# Patient Record
Sex: FEMALE | Race: WHITE | NOT HISPANIC OR LATINO
[De-identification: names, ages, dates, MRNs, and addresses within clinical notes are randomized per-mention and may not be internally consistent; named-entity substitution may affect disease eponyms.]

---

## 2017-06-05 ENCOUNTER — APPOINTMENT (OUTPATIENT)
Dept: CARDIOLOGY | Facility: CLINIC | Age: 72
End: 2017-06-05

## 2017-06-05 VITALS — BODY MASS INDEX: 26.33 KG/M2 | WEIGHT: 158 LBS | HEIGHT: 65 IN

## 2017-06-05 VITALS — HEART RATE: 84 BPM | DIASTOLIC BLOOD PRESSURE: 80 MMHG | RESPIRATION RATE: 18 BRPM | SYSTOLIC BLOOD PRESSURE: 130 MMHG

## 2017-06-27 ENCOUNTER — APPOINTMENT (OUTPATIENT)
Dept: CARDIOLOGY | Facility: CLINIC | Age: 72
End: 2017-06-27

## 2017-09-27 ENCOUNTER — APPOINTMENT (OUTPATIENT)
Dept: CARDIOLOGY | Facility: CLINIC | Age: 72
End: 2017-09-27

## 2017-09-27 VITALS — HEART RATE: 88 BPM | SYSTOLIC BLOOD PRESSURE: 120 MMHG | DIASTOLIC BLOOD PRESSURE: 80 MMHG | RESPIRATION RATE: 18 BRPM

## 2017-09-27 VITALS — WEIGHT: 158 LBS | HEIGHT: 65 IN | BODY MASS INDEX: 26.33 KG/M2

## 2017-11-16 ENCOUNTER — OUTPATIENT (OUTPATIENT)
Dept: OUTPATIENT SERVICES | Facility: HOSPITAL | Age: 72
LOS: 1 days | Discharge: HOME | End: 2017-11-16

## 2017-11-17 DIAGNOSIS — I10 ESSENTIAL (PRIMARY) HYPERTENSION: ICD-10-CM

## 2017-11-17 DIAGNOSIS — E83.52 HYPERCALCEMIA: ICD-10-CM

## 2017-11-17 DIAGNOSIS — N18.3 CHRONIC KIDNEY DISEASE, STAGE 3 (MODERATE): ICD-10-CM

## 2017-12-05 ENCOUNTER — APPOINTMENT (OUTPATIENT)
Dept: CARDIOLOGY | Facility: CLINIC | Age: 72
End: 2017-12-05

## 2017-12-05 VITALS — WEIGHT: 157 LBS | HEIGHT: 65 IN | BODY MASS INDEX: 26.16 KG/M2

## 2017-12-05 VITALS — SYSTOLIC BLOOD PRESSURE: 118 MMHG | HEART RATE: 80 BPM | RESPIRATION RATE: 18 BRPM | DIASTOLIC BLOOD PRESSURE: 70 MMHG

## 2018-06-13 ENCOUNTER — APPOINTMENT (OUTPATIENT)
Dept: CARDIOLOGY | Facility: CLINIC | Age: 73
End: 2018-06-13

## 2018-06-13 VITALS — WEIGHT: 158 LBS | HEIGHT: 65 IN | BODY MASS INDEX: 26.33 KG/M2

## 2018-06-13 VITALS — SYSTOLIC BLOOD PRESSURE: 120 MMHG | DIASTOLIC BLOOD PRESSURE: 80 MMHG | HEART RATE: 76 BPM | RESPIRATION RATE: 18 BRPM

## 2018-06-13 DIAGNOSIS — Z87.19 PERSONAL HISTORY OF OTHER DISEASES OF THE DIGESTIVE SYSTEM: ICD-10-CM

## 2018-06-13 DIAGNOSIS — I35.0 NONRHEUMATIC AORTIC (VALVE) STENOSIS: ICD-10-CM

## 2018-06-13 RX ORDER — ASPIRIN 81 MG
81 TABLET, DELAYED RELEASE (ENTERIC COATED) ORAL
Refills: 0 | Status: ACTIVE | COMMUNITY

## 2018-06-20 ENCOUNTER — OUTPATIENT (OUTPATIENT)
Dept: OUTPATIENT SERVICES | Facility: HOSPITAL | Age: 73
LOS: 1 days | Discharge: HOME | End: 2018-06-20

## 2018-06-20 DIAGNOSIS — J15.9 UNSPECIFIED BACTERIAL PNEUMONIA: ICD-10-CM

## 2018-08-17 ENCOUNTER — APPOINTMENT (OUTPATIENT)
Dept: CARDIOLOGY | Facility: CLINIC | Age: 73
End: 2018-08-17

## 2018-08-22 ENCOUNTER — APPOINTMENT (OUTPATIENT)
Dept: CARDIOLOGY | Facility: CLINIC | Age: 73
End: 2018-08-22

## 2018-08-22 VITALS — RESPIRATION RATE: 18 BRPM | HEART RATE: 88 BPM | SYSTOLIC BLOOD PRESSURE: 120 MMHG | DIASTOLIC BLOOD PRESSURE: 80 MMHG

## 2018-08-22 VITALS — HEIGHT: 66 IN | WEIGHT: 156 LBS | BODY MASS INDEX: 25.07 KG/M2

## 2018-08-22 DIAGNOSIS — I34.0 NONRHEUMATIC MITRAL (VALVE) INSUFFICIENCY: ICD-10-CM

## 2018-08-22 DIAGNOSIS — Z98.890 OTHER SPECIFIED POSTPROCEDURAL STATES: ICD-10-CM

## 2018-08-22 DIAGNOSIS — E78.5 HYPERLIPIDEMIA, UNSPECIFIED: ICD-10-CM

## 2018-08-22 DIAGNOSIS — I73.9 PERIPHERAL VASCULAR DISEASE, UNSPECIFIED: ICD-10-CM

## 2018-08-22 DIAGNOSIS — I10 ESSENTIAL (PRIMARY) HYPERTENSION: ICD-10-CM

## 2018-08-22 DIAGNOSIS — Z95.828 PRESENCE OF OTHER VASCULAR IMPLANTS AND GRAFTS: ICD-10-CM

## 2018-08-22 DIAGNOSIS — R06.02 SHORTNESS OF BREATH: ICD-10-CM

## 2018-08-22 DIAGNOSIS — I25.10 ATHEROSCLEROTIC HEART DISEASE OF NATIVE CORONARY ARTERY W/OUT ANGINA PECTORIS: ICD-10-CM

## 2018-08-22 DIAGNOSIS — Z86.39 PERSONAL HISTORY OF OTHER ENDOCRINE, NUTRITIONAL AND METABOLIC DISEASE: ICD-10-CM

## 2018-08-22 DIAGNOSIS — N18.9 CHRONIC KIDNEY DISEASE, UNSPECIFIED: ICD-10-CM

## 2018-08-23 ENCOUNTER — RESULT REVIEW (OUTPATIENT)
Age: 73
End: 2018-08-23

## 2018-08-23 ENCOUNTER — OUTPATIENT (OUTPATIENT)
Dept: OUTPATIENT SERVICES | Facility: HOSPITAL | Age: 73
LOS: 1 days | Discharge: HOME | End: 2018-08-23

## 2018-08-23 VITALS
WEIGHT: 151.9 LBS | HEART RATE: 103 BPM | SYSTOLIC BLOOD PRESSURE: 92 MMHG | HEIGHT: 62 IN | RESPIRATION RATE: 18 BRPM | DIASTOLIC BLOOD PRESSURE: 49 MMHG | TEMPERATURE: 98 F

## 2018-08-23 VITALS
SYSTOLIC BLOOD PRESSURE: 109 MMHG | HEART RATE: 80 BPM | OXYGEN SATURATION: 95 % | DIASTOLIC BLOOD PRESSURE: 57 MMHG | RESPIRATION RATE: 18 BRPM

## 2018-08-23 DIAGNOSIS — R19.8 OTHER SPECIFIED SYMPTOMS AND SIGNS INVOLVING THE DIGESTIVE SYSTEM AND ABDOMEN: Chronic | ICD-10-CM

## 2018-08-23 RX ORDER — MEPERIDINE HYDROCHLORIDE 50 MG/ML
50 INJECTION INTRAMUSCULAR; INTRAVENOUS; SUBCUTANEOUS ONCE
Qty: 0 | Refills: 0 | Status: DISCONTINUED | OUTPATIENT
Start: 2018-08-23 | End: 2018-09-07

## 2018-08-23 RX ORDER — MIDAZOLAM HYDROCHLORIDE 1 MG/ML
2 INJECTION, SOLUTION INTRAMUSCULAR; INTRAVENOUS ONCE
Qty: 0 | Refills: 0 | Status: DISCONTINUED | OUTPATIENT
Start: 2018-08-23 | End: 2018-09-07

## 2018-08-23 NOTE — H&P ADULT - NSHPPHYSICALEXAM_GEN_ALL_CORE
PHYSICAL EXAM:    T(F): 98 (08-23-18 @ 06:39), Max: 98 (08-23-18 @ 06:39)  HR: 103 (08-23-18 @ 06:39) (103 - 103)  BP: 92/49 (08-23-18 @ 06:39) (92/49 - 92/49)  RR: 18 (08-23-18 @ 06:39) (18 - 18)  GENERAL: NAD, well-developed  CHEST/LUNG: CTAB  HEART: RRR, no murmurs  ABDOMEN: Soft, Nontender, Nondistended; Bowel sounds present  EXTREMITIES:  2+ Peripheral Pulses, No clubbing, cyanosis, or edema  NEURO: AAOx3

## 2018-08-23 NOTE — ASU PATIENT PROFILE, ADULT - PMH
H/O heart surgery  STENT PLACEMENT BYPASS AORTA CELIAC STENT  HTN (hypertension)    MI (myocardial infarction)

## 2018-08-24 LAB
GRAM STN FLD: SIGNIFICANT CHANGE UP
NIGHT BLUE STAIN TISS: SIGNIFICANT CHANGE UP
SPECIMEN SOURCE: SIGNIFICANT CHANGE UP
SPECIMEN SOURCE: SIGNIFICANT CHANGE UP

## 2018-08-25 LAB
CULTURE RESULTS: SIGNIFICANT CHANGE UP
SPECIMEN SOURCE: SIGNIFICANT CHANGE UP

## 2018-08-27 LAB — SURGICAL PATHOLOGY STUDY: SIGNIFICANT CHANGE UP

## 2018-08-28 DIAGNOSIS — I10 ESSENTIAL (PRIMARY) HYPERTENSION: ICD-10-CM

## 2018-08-28 DIAGNOSIS — C34.00 MALIGNANT NEOPLASM OF UNSPECIFIED MAIN BRONCHUS: ICD-10-CM

## 2018-08-28 DIAGNOSIS — R59.9 ENLARGED LYMPH NODES, UNSPECIFIED: ICD-10-CM

## 2018-08-28 DIAGNOSIS — R91.8 OTHER NONSPECIFIC ABNORMAL FINDING OF LUNG FIELD: ICD-10-CM

## 2018-08-29 LAB — NON-GYNECOLOGICAL CYTOLOGY STUDY: SIGNIFICANT CHANGE UP

## 2018-08-31 ENCOUNTER — RESULT REVIEW (OUTPATIENT)
Age: 73
End: 2018-08-31

## 2018-09-01 PROBLEM — I10 ESSENTIAL (PRIMARY) HYPERTENSION: Chronic | Status: ACTIVE | Noted: 2018-08-23

## 2018-09-01 PROBLEM — Z98.890 OTHER SPECIFIED POSTPROCEDURAL STATES: Chronic | Status: ACTIVE | Noted: 2018-08-23

## 2018-09-01 PROBLEM — I21.9 ACUTE MYOCARDIAL INFARCTION, UNSPECIFIED: Chronic | Status: ACTIVE | Noted: 2018-08-23

## 2018-09-06 LAB — NON-GYNECOLOGICAL CYTOLOGY STUDY: SIGNIFICANT CHANGE UP

## 2018-09-09 ENCOUNTER — MOBILE ON CALL (OUTPATIENT)
Age: 73
End: 2018-09-09

## 2018-09-10 ENCOUNTER — APPOINTMENT (OUTPATIENT)
Dept: HEMATOLOGY ONCOLOGY | Facility: CLINIC | Age: 73
End: 2018-09-10

## 2018-09-10 VITALS
TEMPERATURE: 96.7 F | SYSTOLIC BLOOD PRESSURE: 101 MMHG | HEART RATE: 112 BPM | DIASTOLIC BLOOD PRESSURE: 47 MMHG | RESPIRATION RATE: 18 BRPM | HEIGHT: 66 IN

## 2018-09-10 DIAGNOSIS — I26.99 OTHER PULMONARY EMBOLISM W/OUT ACUTE COR PULMONALE: ICD-10-CM

## 2018-09-10 DIAGNOSIS — C34.92 MALIGNANT NEOPLASM OF UNSPECIFIED PART OF RIGHT BRONCHUS OR LUNG: ICD-10-CM

## 2018-09-10 DIAGNOSIS — J44.9 CHRONIC OBSTRUCTIVE PULMONARY DISEASE, UNSPECIFIED: ICD-10-CM

## 2018-09-10 DIAGNOSIS — C34.91 MALIGNANT NEOPLASM OF UNSPECIFIED PART OF RIGHT BRONCHUS OR LUNG: ICD-10-CM

## 2018-09-10 DIAGNOSIS — D63.0 ANEMIA IN NEOPLASTIC DISEASE: ICD-10-CM

## 2018-09-10 RX ORDER — ENOXAPARIN SODIUM 80 MG/.8ML
80 INJECTION SUBCUTANEOUS
Refills: 0 | Status: ACTIVE | COMMUNITY

## 2018-09-10 RX ORDER — CLOPIDOGREL 75 MG/1
75 TABLET, FILM COATED ORAL
Refills: 0 | Status: COMPLETED | COMMUNITY
End: 2018-09-10

## 2018-09-10 RX ORDER — PREDNISONE 50 MG/1
TABLET ORAL
Refills: 0 | Status: ACTIVE | COMMUNITY

## 2018-09-11 PROBLEM — J44.9 COPD (CHRONIC OBSTRUCTIVE PULMONARY DISEASE): Status: ACTIVE | Noted: 2018-09-11

## 2018-09-14 PROBLEM — I26.99 PULMONARY EMBOLISM: Status: ACTIVE | Noted: 2018-09-11

## 2018-09-14 PROBLEM — C34.91: Status: ACTIVE | Noted: 2018-09-14

## 2018-09-14 PROBLEM — D63.0 ANEMIA IN NEOPLASTIC DISEASE: Status: ACTIVE | Noted: 2018-09-14

## 2018-09-14 NOTE — ASSESSMENT
[Palliative Care Plan] : Patient was apprised of incurable nature of disease.  Hospice and Palliative care options discussed. [FreeTextEntry1] : In summery this is a 72 year old female with multiple medical problems including hypertension, hyperlipidemia, PCI of RCA , PVD and Mitral regurgitation and  small bowel obstruction. Had lysis of adhesions followed by stenting of the celiac and mesenteric arteries, severe COPD, ECOG of 4, high probability of PE and stage IV Squamous cell carcinoma of the lung.\par \par #Stage IV metastatic SCC of lung ( contralateral nodules) she is to frail for any definitive treatment even if the contralateral nodules are benign \par -she has end stage severe COPD,  I spoke to Dr. Dupree who does not think the patient has interstitial lung disease as a separate entity \par -her ECOG is a 4 and she is declining\par -I explained to them that hospice is reasonable but they were not ready for this\par -she is to frail for chemotherapy or chemoimmunotherapy\par -I explained that she is unlikly to [place] any  mutation given SCC, and smoker but will check for EFGR/ALK/ROS/BRAF and will stain for PD-l1\par - I will offer immunotherapy but if she declined would transition to hospice\par --depending on PD-l1 results will offer Keytruda  if  >1% or Opdivo if <1%. We went over response rates based on PD-l1.  We went over the side effects such as fatigue, rash, pneumonitis, colitis, and rarely death, full information was provided in writing on both  treatment\par \par # SOB this is mainly due to COPD maybe a component from cancer and PE and Anemia \par -to complete steroid taper \par -on oxygen\par -inhalers\par \par # PE\par -I am not sure if she had a PE, she only had V/Q scan due to CKD but is on Lovenox GFR from records appears adequate for Lovenox, V/q may have been false positive and she has reasons for SOB but also high risk for MA\par -will continue with Lovenox and transition her to Apixaban after she competes her syringes \par \par #Anemia, chronic, moderate,  is multifactor, due to malignancy, CKD and chronic disease\par -will monitor will consider transfusion if becomes more symptomatic and Hgb falls \par \par Prognosis is poor  individual instruction

## 2018-09-14 NOTE — REVIEW OF SYSTEMS
[Lower Ext Edema] : lower extremity edema [Shortness Of Breath] : shortness of breath [Wheezing] : wheezing [Cough] : cough [SOB on Exertion] : shortness of breath during exertion [Negative] : Allergic/Immunologic [Chills] : no chills [Recent Change In Weight] : ~T no recent weight change

## 2018-09-14 NOTE — RESULTS/DATA
[FreeTextEntry1] : IMPRESSION:\par \par New pulmonary findings. The 2 masses in the right lower lobe is suspicious for neoplasm. The other pulmonary nodules that are in the range of 3 to 5 mm may represent metastatic disease. PET CT strongly recommended.\par Significant an enlarged mediastinal and possibly a left hilar lymph nodes. Mild narrowing of the left upper lobe segmental bronchus.\par Interstitial disease slightly worsened.\par Patchy airspace disease in the lingula likely secondary to mild compression and/or airspace disease. It does not appear to be suspicious.\par \par An entry was made into the [employer] Website to ensure clinician receipt of report.\par \par CT CHEST WITHOUT IV CONTRAST\par \par CLINICAL INDICATION: 25 pack year smoking history. Shortness of breath and cough. No known primary. Coronary stent.\par \par COMPARISON: 2013\par \par TECHNIQUE: Noncontrast chest CT was performed. Multiplanar CT and HRCT images were reviewed.\par \par FINDINGS:\par \par LUNGS, AIRWAYS: Me trachea main and visualized segmental bronchi are patent. There is mild narrowing at the origin off the left upper lobe segmental bronchi. There is mild traction bronchiectasis off the middle lobe lingula right lower lobe bronchi secondary to chronic changes.\par Severe centrilobular emphysema as previously.\par Multiple bullae predominantly in the lower lobes also as previously.\par Interstitial fibrosis with stacked cystic appearance mildly worsened.\par 5 mm nodule right upper lobe image 13 a new finding.\par 5 mm nodule medial left upper lobe in image 18 new finding.\par 3 mm nodule adjacent to the horizontal fissure on the right new.\par 4 mm nodule left lower lobe in image 38 is new.\par Patchy areas of consolidation in the lingula new.\par There is a stellate density in the right lower lobe 8 mm and adjacent to it a 1.2 cm density in image 44 both new..\par \par PLEURA: No pleural fluid.\par \par MEDIASTINUM, RICHY, LYMPH NODES: No significant axillary adenopathy. Superior mediastinal adenopathy increased in size. Pretracheal lymph node short axis X mm and another short axis IX mm both larger than previously. Significant adenopathy more inferiorly in the paratracheal region on the right 1.6 cm short axis. Aortopulmonary window lymph node short axis a 1 cm. Precarinal adenopathy short axis I.7 and 1.7 cm. Subcarinal adenopathy short axis II.2 cm. No obvious hilar nodes on the right. However the left hilum is larger than previously with narrowing of the left upper lobe bronchus. This may be due to adenopathy. Exact measurements are not possible secondary to lack of intravenous contrast. This is best seen in image 29 and 30. Thoracic esophagus unremarkable. Small type on hiatus hernia.\par \par HEART AND VESSELS: Heart size normal. Aorta normal in caliber. Coronary artery calcification. No pericardial fluid.\par \par UPPER ABDOMEN: No abnormality. Adrenals normal.\par \par BONES AND SOFT TISSUES: Soft tissues normal. No aggressive bony lesion.\par \par LOWER NECK: No abnormality.\par \par Electronic Signature: I personally reviewed the images and agree with this report. Final Report: Dictated by and Signed by Attending Mari Gallo MD 6/20/2018 4:54 PM\par  \par \par \par IMPRESSION:\par \par 3. FDG AVID RIGHT SUPRACLAVICULAR LYMPH NODE, MOST COMPATIBLE WITH AMIRAH METASTATIC DISEASE.\par \par Electronic Signature: I personally reviewed the images and agree with this report. Final Report: Dictated by and Signed by Attending Tracy Cash MD 7/12/2018 1:26 PM\par *******END OF ADDENDUM******\par \par IMPRESSION:\par \par 1. FDG AVID RIGHT LOWER LOBE AND LINGULA LESIONS, REPRESENTING NEOPLASM.\par \par 2. FDG AVID METASTATIC DISEASE INVOLVING THE LUNGS AND MEDIASTINAL LYMPH NODES.\par \par \par \par \par \par \par \par \par Diagnostic confidence level used in this report:\par \par Consistent with/compatible with or no modifier - greater than 98%\par Most likely - greater than 90%\par Likely/probably - greater than 75%\par Possibly 50%\par Less likely - less than 25%\par Unlikely - less than 5%\par \par \par \par \par \par \par \par \par \par F-18 FDG PET/CT SCAN FROM THE SKULL BASE TO THE MID THIGHS\par \par CLINICAL HISTORY: 72-year-old female with suspicious right lower lobe lesion seen on the recent CT scan. 40 year smoking history, quit smoking in 2/2018.\par \par TECHNIQUE: The patient received 11.2 mCi of F-18 FDG intravenously via the right arm vein. 45 minutes later, low-dose noncontrast transmission CT acquisition was performed first. Whole-body PET images were obtained from the base of the skull through the mid thighs immediately after acquisition of the CT scan without changing the patient's positioning. Noncontrast low-dose CT images were obtained for the purposes of attenuation correction and anatomic co-registration with PET images. Fused images of PET (metabolic) and CT (anatomy) were reviewed. The patient's blood glucose level at the time of FDG injection was 115 mg/dl.\par \par Comparison: 6/20/2018 CT scan.\par \par FINDINGS:\par \par HEAD AND NECK:\par \par There is a 1 cm right supraclavicular lymph node at the level of the right thyroid gland with a maximum SUV of 2.3 (series 2 image 51), most compatible with amirah metastatic disease.\par \par Physiologic activity is visualized in the nasopharynx, oral cavity, hypopharynx, larynx and thyroid gland.\par \par CT images demonstrate no evidence of left cervical adenopathy. The thyroid gland is unremarkable. Mild mucosal thickening of the ethmoidal air cells is seen.\par \par THORAX:\par \par There is hypermetabolic activity in the 1.4 cm density with pleural attachment in the lateral basal segment of the right lower lobe with a maximum SUV of 6.2 (series 2 image 106) and 1.9 x 1.6 cm lobulated nodule in the lower lingula abutting the left fissure with a maximum SUV of 14.4 (series 2 image 99), consistent with neoplasms.\par \par FDG avid pulmonary nodules are identified:\par -1.2 x 1 cm nodule in the left lower lobe with a maximum SUV of 5.5 (series 2 image 99)\par -7 mm in nodule in the left lower lobe with a maximum SUV of 4.9 (series 2 image 95)\par -7 mm in nodule in the right upper lobe with a maximum SUV of 2.4 (series 2 image 62).\par FDG nonavid bilateral subcentimeter pulmonary nodules are identified, for example: Right upper lobe (series 2 image 87), left upper lobe (series 2 image 64, 73). All of the above lesions are consistent with pulmonary metastatic disease.\par \par Hypermetabolic mediastinal adenopathy is visualized:\par -Pretracheal, right paratracheal and precarinal adenopathy, the largest measuring up to 2.6 x 1.7 cm with a maximum SUV of 9.2 (series 2 image 76)\par -Aortopulmonary window adenopathy, the largest measuring up to 2.6 x 1.9 cm with a maximum SUV of 10.2 (series 2 image 76)\par -Subcarinal adenopathy spanning up to 4 x 2.3 cm with a maximum SUV of 12.0 (series 2 image 87)\par -Right hilar adenopathy with a maximum SUV of 4.3 (series 2 image 86)\par -Left hilar adenopathy with a maximum SUV of 5.0 (series 2 image 81).\par All of the above lesions are consistent with amirah metastatic disease.\par \par Diffuse patchy FDG activity is seen in the chronic changes in the lingula and left lower lobe with a maximum SUV of 3.3 (series 2 image 94).\par \par Normal FDG activity is present in the breasts and chest wall.\par \par CT images demonstrate no evidence of axillary adenopathy. The heart is not enlarged. Multivessel coronary calcification and atherosclerotic aortic calcification are present. Stents in the RCA are seen. There is no evidence of pneumothorax, pleural or pericardial effusion.\par \par ABDOMEN AND PELVIS:\par \par Physiologic FDG activity is visualized in all regions.\par \par There is no evidence of hypermetabolic adenopathy within the abdomen and pelvis. The liver and spleen are not enlarged. The adrenal glands are unremarkable. Vascular stents in the celiac artery, distal aorta and common iliac arteries are present. Right renal cysts and atherosclerotic aortic calcification are seen.\par \par SKELETAL SYSTEM:\par \par Normal FDG activity is noted in the axial skeleton and visualized portion of the appendicular skeleton.\par \par CT images demonstrate degenerative changes in the spine. There is no evidence of aggressive lytic or blastic lesion.\par \par \par Electronic Signature: I personally reviewed the images and agree with this report. Final Report: Dictated by and Signed by Attending Tracy Cash MD 7/12/2018 1:08 PM\par

## 2018-09-14 NOTE — HISTORY OF PRESENT ILLNESS
[Disease: _____________________] : Disease: [unfilled] [N: ___] : N[unfilled] [M: ___] : M[unfilled] [AJCC Stage: ____] : AJCC Stage: [unfilled] [de-identified] : CC: I am short of breath.\par \par Here at the request of Dr. Hector Dupree\par \par This is a 72 year old female with history of  hypertension, hyperlipidemia, PCI of RCA , PVD and Mitral regurgitation and  small bowel obstruction. Had lysis of adhesions followed by stenting of the celiac and mesenteric arteries . Saw Dr. Joon Perla.  who diagnosed her with COPD, PFTs were pending.  Work up also included CT chest that led to diagnosis of squamous cell of the lung see work up bellow.\par \par She is here with her  and daughter. They live in [place] and in [place], her daughter lives in  who they are staying with at this point.  She was in her usual state of health. playing tennis, up until February of 2018 when she started to develop SOB and presented to  [facility] in [place] at that time. Records that I have showed a Hemaglobin on 10.2,  Createnin of 1.16 calcium 8.5,  CTA PE protocol 3/2/18: NO PE, severe interstitial lung disease with subpleural honycombing, prominent infrahilar and left periaotic adenopathy, 1.5cm and 2.0 cm subcarinal. Vq sacn from 2/28/18  was intermidate to high probability.  Dopplers 3/1: NO DVT . ECHO 3/1/18:  EF was 60-65%\par \par After that admission she stopped smoking. 2/2018 but smoked in excess of 40 years. \par \par Recently she has acute SOB and was admitted [facility] on 8/31/18. She was streted on Oxygen. He was treated for COPD exacerbation on prednisone taper and on Lovenox for  Pulmonary Embolism. Her work up showed WBC of 17.2, Hgb of 9.7 mcv 84,  plts 389,  D dimer of 4.01 normal up to 0.49 calcium of 9.4, normal LFTs, Cr of 1.38\par MRI Brain  no contrast 8/31/18: No metastasis. V/Q scan  High Probability of PE. No CTA was done due to CKD. CT chest no contrast  Multiple pulmonary nodules bilaterly largest 15 mm with severe Emphysema and chronic interstitial fibriotic changes in bases.  Multiple mediastinal  nodes largest 3.2 cm subcarinal.\par \par Imaging:\par Full reports are in narrative\par CTA PE protocol 3/2/18: CTA PE protocol 3/2/18: NO PE, severe intersitial lung diseae with subpleural honycombing, prominent infrahilar and left periaotic adenopathy, 1.5cm and 2.0 cm subcarinal. \par CT chest w/o IV contrast ( 6/2018): New pulmonary findings. The 2 masses in the right lower lobe is suspicious for neoplasm. The other pulmonary nodules that are in the range of 3 to 5 mm may represent metastatic disease. PET CT strongly recommended.\par Significant an enlarged mediastinal and possibly a left hilar lymph nodes. Mild narrowing of the left upper lobe segmental bronchus.\par Interstitial disease slightly worsened.\par Patchy airspace disease in the lingula likely secondary to mild compression and/or airspace disease. It does not appear to be suspicious.\par PET/CT 7/11/18: Regional:  1. FDG avid right lower lobe and lingual lesion 2.  1. FDG AVID RIGHT LOWER LOBE AND LINGULA LESIONS, REPRESENTING NEOPLASM. 2. FDG AVID METASTATIC DISEASE INVOLVING THE LUNGS AND MEDIASTINAL LYMPH NODES.\par RI Brain  no contrast 8/31/18: No metastasis. V/Q scan  High Probability of PE. No CTA was done due to CKD. CT chest no contrast  Multiple pulmonary nodules bilaterly largest 15 mm with severe Emphysema and chronic interstitial fibriotic changes in bases.  Multiple mediastinal  nodes largest 3.2 cm subcarinal.\par \par \par \par 8/23/18  EBUS Dr. Thien Dupree\par 1     Main carinal biopsy : Invasive squamous cell carcinoma\par 2     Left upper lobe carinal Biopsy  Minute fragment of bronchial mucosa and submucosa showing\par mild chronic nonspecific inflammation and fibrosis.\par \par BRONCHIAL WASH\par SUSPICIOUS FOR MALIGNANCY.\par \par 8/28/18 Cervical Mediastinoscopy:  Level 4 lymph node 3 nodes negative for tumor, Level 4 left negative for tumor (1 node), Level 4 right  node ( 3 nodes) negative for tumor, Level 2 (2 nodes) negative for tumor\par \par Path:   8/31/18 EBUS Dr. Thien Dupree\par LYMPH NODE, SUBCLAVICULAR, EBUS-GUIDED FNA POSITIVE FOR MALIGNANT CELLS.\par - NON-SMALL CELL CARCINOMA.\par - Immunohistochemical stains show the tumor is positive for CK7,\par p63 and/or CK5/6, negative for CK20, TTF1,\par CDX2, and PAX8; support the diagnosis of squamous cell carcinoma.\par \par \par She is currently in an a wheelchair on oxygen. She is unable to ambulate. Her ECOG is a 4. She does not know where to live since she cant get upstairs to her house or her daughters house.  [de-identified] : Squamous Cell

## 2018-09-14 NOTE — PHYSICAL EXAM
[Completely disabled. Cannot carry on any self care. Totally confined to bed or chair] : Status 4- Completely disabled. Cannot carry on any self care. Totally confined to bed or chair [Normal] : affect appropriate [Ulcers] : no ulcers [Thrush] : no thrush [de-identified] : She has edema in lower extremity

## 2018-09-14 NOTE — CONSULT LETTER
[Dear  ___] : Dear  [unfilled], [Consult Letter:] : I had the pleasure of evaluating your patient, [unfilled]. [Please see my note below.] : Please see my note below. [Consult Closing:] : Thank you very much for allowing me to participate in the care of this patient.  If you have any questions, please do not hesitate to contact me. [Sincerely,] : Sincerely, [FreeTextEntry3] : Jossue

## 2018-09-15 ENCOUNTER — OUTPATIENT (OUTPATIENT)
Dept: OUTPATIENT SERVICES | Facility: HOSPITAL | Age: 73
LOS: 1 days | Discharge: HOME | End: 2018-09-15

## 2018-09-15 DIAGNOSIS — I26.99 OTHER PULMONARY EMBOLISM WITHOUT ACUTE COR PULMONALE: ICD-10-CM

## 2018-09-15 DIAGNOSIS — R19.8 OTHER SPECIFIED SYMPTOMS AND SIGNS INVOLVING THE DIGESTIVE SYSTEM AND ABDOMEN: Chronic | ICD-10-CM

## 2018-09-17 RX ORDER — MORPHINE SULFATE 100 MG/5ML
20 SOLUTION ORAL EVERY 6 HOURS
Qty: 1 | Refills: 0 | Status: ACTIVE | COMMUNITY
Start: 2018-09-17 | End: 1900-01-01

## 2018-09-17 RX ORDER — PREDNISONE 20 MG/1
20 TABLET ORAL DAILY
Qty: 60 | Refills: 1 | Status: ACTIVE | COMMUNITY
Start: 2018-09-17 | End: 1900-01-01

## 2018-09-20 LAB
CULTURE RESULTS: SIGNIFICANT CHANGE UP
SPECIMEN SOURCE: SIGNIFICANT CHANGE UP

## 2018-09-21 ENCOUNTER — OUTPATIENT (OUTPATIENT)
Dept: OUTPATIENT SERVICES | Facility: HOSPITAL | Age: 73
LOS: 1 days | Discharge: HOME | End: 2018-09-21

## 2018-09-21 ENCOUNTER — LABORATORY RESULT (OUTPATIENT)
Age: 73
End: 2018-09-21

## 2018-09-21 ENCOUNTER — APPOINTMENT (OUTPATIENT)
Dept: INFUSION THERAPY | Facility: CLINIC | Age: 73
End: 2018-09-21

## 2018-09-21 VITALS
TEMPERATURE: 96 F | SYSTOLIC BLOOD PRESSURE: 81 MMHG | RESPIRATION RATE: 18 BRPM | DIASTOLIC BLOOD PRESSURE: 44 MMHG | HEART RATE: 97 BPM

## 2018-09-21 VITALS — RESPIRATION RATE: 18 BRPM | DIASTOLIC BLOOD PRESSURE: 46 MMHG | HEART RATE: 99 BPM | SYSTOLIC BLOOD PRESSURE: 84 MMHG

## 2018-09-21 DIAGNOSIS — D63.0 ANEMIA IN NEOPLASTIC DISEASE: ICD-10-CM

## 2018-09-21 DIAGNOSIS — C34.90 MALIGNANT NEOPLASM OF UNSPECIFIED PART OF UNSPECIFIED BRONCHUS OR LUNG: ICD-10-CM

## 2018-09-21 DIAGNOSIS — J44.9 CHRONIC OBSTRUCTIVE PULMONARY DISEASE, UNSPECIFIED: ICD-10-CM

## 2018-09-21 DIAGNOSIS — I26.99 OTHER PULMONARY EMBOLISM WITHOUT ACUTE COR PULMONALE: ICD-10-CM

## 2018-09-21 DIAGNOSIS — R19.8 OTHER SPECIFIED SYMPTOMS AND SIGNS INVOLVING THE DIGESTIVE SYSTEM AND ABDOMEN: Chronic | ICD-10-CM

## 2018-09-21 RX ORDER — NIVOLUMAB 10 MG/ML
480 INJECTION INTRAVENOUS ONCE
Qty: 0 | Refills: 0 | Status: COMPLETED | OUTPATIENT
Start: 2018-09-21 | End: 2018-09-21

## 2018-09-21 RX ADMIN — NIVOLUMAB 196 MILLIGRAM(S): 10 INJECTION INTRAVENOUS at 15:35

## 2018-09-23 ENCOUNTER — INPATIENT (INPATIENT)
Facility: HOSPITAL | Age: 73
LOS: 4 days | End: 2018-09-28
Attending: INTERNAL MEDICINE | Admitting: INTERNAL MEDICINE
Payer: MEDICARE

## 2018-09-23 VITALS
SYSTOLIC BLOOD PRESSURE: 129 MMHG | TEMPERATURE: 97 F | DIASTOLIC BLOOD PRESSURE: 68 MMHG | RESPIRATION RATE: 26 BRPM | OXYGEN SATURATION: 93 % | HEART RATE: 118 BPM

## 2018-09-23 DIAGNOSIS — R19.8 OTHER SPECIFIED SYMPTOMS AND SIGNS INVOLVING THE DIGESTIVE SYSTEM AND ABDOMEN: Chronic | ICD-10-CM

## 2018-09-23 DIAGNOSIS — Z95.1 PRESENCE OF AORTOCORONARY BYPASS GRAFT: Chronic | ICD-10-CM

## 2018-09-23 LAB
ALBUMIN SERPL ELPH-MCNC: 2.6 G/DL — LOW (ref 3.5–5.2)
ALP SERPL-CCNC: 97 U/L — SIGNIFICANT CHANGE UP (ref 30–115)
ALT FLD-CCNC: 18 U/L — SIGNIFICANT CHANGE UP (ref 0–41)
ANION GAP SERPL CALC-SCNC: 20 MMOL/L — HIGH (ref 7–14)
APTT BLD: 32.9 SEC — SIGNIFICANT CHANGE UP (ref 27–39.2)
AST SERPL-CCNC: 35 U/L — SIGNIFICANT CHANGE UP (ref 0–41)
BASOPHILS # BLD AUTO: 0 K/UL — SIGNIFICANT CHANGE UP (ref 0–0.2)
BASOPHILS NFR BLD AUTO: 0 % — SIGNIFICANT CHANGE UP (ref 0–1)
BILIRUB SERPL-MCNC: 0.3 MG/DL — SIGNIFICANT CHANGE UP (ref 0.2–1.2)
BUN SERPL-MCNC: 15 MG/DL — SIGNIFICANT CHANGE UP (ref 10–20)
CALCIUM SERPL-MCNC: 9.7 MG/DL — SIGNIFICANT CHANGE UP (ref 8.5–10.1)
CHLORIDE SERPL-SCNC: 96 MMOL/L — LOW (ref 98–110)
CK MB CFR SERPL CALC: 3.2 NG/ML — SIGNIFICANT CHANGE UP (ref 0.6–6.3)
CK MB CFR SERPL CALC: 3.4 NG/ML — SIGNIFICANT CHANGE UP (ref 0.6–6.3)
CK SERPL-CCNC: 45 U/L — SIGNIFICANT CHANGE UP (ref 0–225)
CK SERPL-CCNC: 46 U/L — SIGNIFICANT CHANGE UP (ref 0–225)
CO2 SERPL-SCNC: 22 MMOL/L — SIGNIFICANT CHANGE UP (ref 17–32)
CREAT SERPL-MCNC: 0.8 MG/DL — SIGNIFICANT CHANGE UP (ref 0.7–1.5)
EOSINOPHIL # BLD AUTO: 0 K/UL — SIGNIFICANT CHANGE UP (ref 0–0.7)
EOSINOPHIL NFR BLD AUTO: 0 % — SIGNIFICANT CHANGE UP (ref 0–8)
GAS PNL BLDV: SIGNIFICANT CHANGE UP
GLUCOSE SERPL-MCNC: 150 MG/DL — HIGH (ref 70–99)
HCT VFR BLD CALC: 29.7 % — LOW (ref 37–47)
HGB BLD-MCNC: 9.6 G/DL — LOW (ref 12–16)
INR BLD: 1.35 RATIO — HIGH (ref 0.65–1.3)
LACTATE SERPL-SCNC: 2.2 MMOL/L — SIGNIFICANT CHANGE UP (ref 0.5–2.2)
LYMPHOCYTES # BLD AUTO: 0.58 K/UL — LOW (ref 1.2–3.4)
LYMPHOCYTES # BLD AUTO: 2.6 % — LOW (ref 20.5–51.1)
MCHC RBC-ENTMCNC: 26.8 PG — LOW (ref 27–31)
MCHC RBC-ENTMCNC: 32.3 G/DL — SIGNIFICANT CHANGE UP (ref 32–37)
MCV RBC AUTO: 83 FL — SIGNIFICANT CHANGE UP (ref 81–99)
MONOCYTES # BLD AUTO: 0.99 K/UL — HIGH (ref 0.1–0.6)
MONOCYTES NFR BLD AUTO: 4.4 % — SIGNIFICANT CHANGE UP (ref 1.7–9.3)
NEUTROPHILS # BLD AUTO: 20.53 K/UL — HIGH (ref 1.4–6.5)
NEUTROPHILS NFR BLD AUTO: 90.4 % — HIGH (ref 42.2–75.2)
PLATELET # BLD AUTO: 355 K/UL — SIGNIFICANT CHANGE UP (ref 130–400)
POTASSIUM SERPL-MCNC: 4.4 MMOL/L — SIGNIFICANT CHANGE UP (ref 3.5–5)
POTASSIUM SERPL-SCNC: 4.4 MMOL/L — SIGNIFICANT CHANGE UP (ref 3.5–5)
PROT SERPL-MCNC: 5.9 G/DL — LOW (ref 6–8)
PROTHROM AB SERPL-ACNC: 14.5 SEC — HIGH (ref 9.95–12.87)
RBC # BLD: 3.58 M/UL — LOW (ref 4.2–5.4)
RBC # FLD: 19.4 % — HIGH (ref 11.5–14.5)
SODIUM SERPL-SCNC: 138 MMOL/L — SIGNIFICANT CHANGE UP (ref 135–146)
TROPONIN T SERPL-MCNC: 0.02 NG/ML — HIGH
TROPONIN T SERPL-MCNC: 0.02 NG/ML — HIGH
TROPONIN T SERPL-MCNC: <0.01 NG/ML — SIGNIFICANT CHANGE UP
WBC # BLD: 22.49 K/UL — HIGH (ref 4.8–10.8)
WBC # FLD AUTO: 22.49 K/UL — HIGH (ref 4.8–10.8)

## 2018-09-23 PROCEDURE — 93970 EXTREMITY STUDY: CPT | Mod: 26

## 2018-09-23 RX ORDER — ENOXAPARIN SODIUM 100 MG/ML
40 INJECTION SUBCUTANEOUS EVERY 12 HOURS
Qty: 0 | Refills: 0 | Status: DISCONTINUED | OUTPATIENT
Start: 2018-09-23 | End: 2018-09-23

## 2018-09-23 RX ORDER — HYDROCHLOROTHIAZIDE 25 MG
25 TABLET ORAL DAILY
Qty: 0 | Refills: 0 | Status: DISCONTINUED | OUTPATIENT
Start: 2018-09-23 | End: 2018-09-23

## 2018-09-23 RX ORDER — PANTOPRAZOLE SODIUM 20 MG/1
1 TABLET, DELAYED RELEASE ORAL
Qty: 0 | Refills: 0 | COMMUNITY

## 2018-09-23 RX ORDER — CEFEPIME 1 G/1
2000 INJECTION, POWDER, FOR SOLUTION INTRAMUSCULAR; INTRAVENOUS ONCE
Qty: 0 | Refills: 0 | Status: COMPLETED | OUTPATIENT
Start: 2018-09-23 | End: 2018-09-23

## 2018-09-23 RX ORDER — ENOXAPARIN SODIUM 100 MG/ML
70 INJECTION SUBCUTANEOUS
Qty: 0 | Refills: 0 | COMMUNITY

## 2018-09-23 RX ORDER — LANOLIN ALCOHOL/MO/W.PET/CERES
2 CREAM (GRAM) TOPICAL
Qty: 0 | Refills: 0 | COMMUNITY

## 2018-09-23 RX ORDER — CHLORHEXIDINE GLUCONATE 213 G/1000ML
1 SOLUTION TOPICAL
Qty: 0 | Refills: 0 | Status: DISCONTINUED | OUTPATIENT
Start: 2018-09-23 | End: 2018-09-28

## 2018-09-23 RX ORDER — CEFEPIME 1 G/1
2000 INJECTION, POWDER, FOR SOLUTION INTRAMUSCULAR; INTRAVENOUS EVERY 12 HOURS
Qty: 0 | Refills: 0 | Status: DISCONTINUED | OUTPATIENT
Start: 2018-09-24 | End: 2018-09-24

## 2018-09-23 RX ORDER — ASPIRIN/CALCIUM CARB/MAGNESIUM 324 MG
325 TABLET ORAL DAILY
Qty: 0 | Refills: 0 | Status: DISCONTINUED | OUTPATIENT
Start: 2018-09-23 | End: 2018-09-23

## 2018-09-23 RX ORDER — ENOXAPARIN SODIUM 100 MG/ML
70 INJECTION SUBCUTANEOUS EVERY 12 HOURS
Qty: 0 | Refills: 0 | Status: DISCONTINUED | OUTPATIENT
Start: 2018-09-23 | End: 2018-09-28

## 2018-09-23 RX ORDER — IPRATROPIUM/ALBUTEROL SULFATE 18-103MCG
3 AEROSOL WITH ADAPTER (GRAM) INHALATION
Qty: 0 | Refills: 0 | Status: COMPLETED | OUTPATIENT
Start: 2018-09-23 | End: 2018-09-23

## 2018-09-23 RX ORDER — METOPROLOL TARTRATE 50 MG
25 TABLET ORAL DAILY
Qty: 0 | Refills: 0 | Status: DISCONTINUED | OUTPATIENT
Start: 2018-09-23 | End: 2018-09-23

## 2018-09-23 RX ORDER — SIMVASTATIN 20 MG/1
10 TABLET, FILM COATED ORAL AT BEDTIME
Qty: 0 | Refills: 0 | Status: DISCONTINUED | OUTPATIENT
Start: 2018-09-23 | End: 2018-09-23

## 2018-09-23 RX ORDER — ASPIRIN/CALCIUM CARB/MAGNESIUM 324 MG
1 TABLET ORAL
Qty: 0 | Refills: 0 | COMMUNITY

## 2018-09-23 RX ORDER — ALBUTEROL 90 UG/1
2 AEROSOL, METERED ORAL EVERY 4 HOURS
Qty: 0 | Refills: 0 | Status: DISCONTINUED | OUTPATIENT
Start: 2018-09-23 | End: 2018-09-28

## 2018-09-23 RX ORDER — IPRATROPIUM/ALBUTEROL SULFATE 18-103MCG
3 AEROSOL WITH ADAPTER (GRAM) INHALATION EVERY 6 HOURS
Qty: 0 | Refills: 0 | Status: DISCONTINUED | OUTPATIENT
Start: 2018-09-23 | End: 2018-09-24

## 2018-09-23 RX ORDER — PANTOPRAZOLE SODIUM 20 MG/1
40 TABLET, DELAYED RELEASE ORAL
Qty: 0 | Refills: 0 | Status: DISCONTINUED | OUTPATIENT
Start: 2018-09-23 | End: 2018-09-24

## 2018-09-23 RX ORDER — ENOXAPARIN SODIUM 100 MG/ML
0 INJECTION SUBCUTANEOUS
Qty: 0 | Refills: 0 | COMMUNITY

## 2018-09-23 RX ORDER — FUROSEMIDE 40 MG
40 TABLET ORAL ONCE
Qty: 0 | Refills: 0 | Status: DISCONTINUED | OUTPATIENT
Start: 2018-09-23 | End: 2018-09-23

## 2018-09-23 RX ORDER — CEFEPIME 1 G/1
INJECTION, POWDER, FOR SOLUTION INTRAMUSCULAR; INTRAVENOUS
Qty: 0 | Refills: 0 | Status: DISCONTINUED | OUTPATIENT
Start: 2018-09-23 | End: 2018-09-24

## 2018-09-23 RX ORDER — BUDESONIDE AND FORMOTEROL FUMARATE DIHYDRATE 160; 4.5 UG/1; UG/1
2 AEROSOL RESPIRATORY (INHALATION)
Qty: 0 | Refills: 0 | Status: DISCONTINUED | OUTPATIENT
Start: 2018-09-23 | End: 2018-09-26

## 2018-09-23 RX ORDER — METOPROLOL TARTRATE 50 MG
1 TABLET ORAL
Qty: 0 | Refills: 0 | COMMUNITY

## 2018-09-23 RX ORDER — LOSARTAN POTASSIUM 100 MG/1
50 TABLET, FILM COATED ORAL DAILY
Qty: 0 | Refills: 0 | Status: DISCONTINUED | OUTPATIENT
Start: 2018-09-23 | End: 2018-09-23

## 2018-09-23 RX ORDER — EZETIMIBE AND SIMVASTATIN 10; 80 MG/1; MG/1
1 TABLET, FILM COATED ORAL
Qty: 0 | Refills: 0 | COMMUNITY

## 2018-09-23 RX ADMIN — Medication 3 MILLILITER(S): at 09:45

## 2018-09-23 RX ADMIN — Medication 1 MILLIGRAM(S): at 19:11

## 2018-09-23 RX ADMIN — CEFEPIME 100 MILLIGRAM(S): 1 INJECTION, POWDER, FOR SOLUTION INTRAMUSCULAR; INTRAVENOUS at 12:42

## 2018-09-23 RX ADMIN — ENOXAPARIN SODIUM 70 MILLIGRAM(S): 100 INJECTION SUBCUTANEOUS at 18:23

## 2018-09-23 RX ADMIN — Medication 3 MILLILITER(S): at 19:41

## 2018-09-23 RX ADMIN — Medication 3 MILLILITER(S): at 09:50

## 2018-09-23 RX ADMIN — Medication 125 MILLIGRAM(S): at 10:02

## 2018-09-23 RX ADMIN — Medication 3 MILLILITER(S): at 10:03

## 2018-09-23 NOTE — H&P ADULT - HISTORY OF PRESENT ILLNESS
73 y/o lady with PMH HTN, stage 4 lung CA, b/l PE, COPD on home oxygen (6 L) who presents with cough, wheezing, and acute shortness of breath. Patient had her first chemo treatment last week. As per , she has been coughing up blood at times. Patient presents in acute respiratory distress and was hypoxic to 80's on RA. Patient placed on immediate BIPAP after being rushed into Critical Care ED. Appropriate labs sent, EKG, CXR.  Patient had CT scan of chest to evaluate lung and for PE. Respiratory status improved with BIPAP.  Patient found to have multi-focal pneumonia and treated for HCAP.No PE on CT scan of chest.    ICU admission for sepsis, multifocal pneumonia, and respiratory distress. 73 y/o lady with PMH HTN, stage 4 lung CA, b/l PE, hx aortofemoral bypass, hx celiac artery stent, hx SMA stent COPD on home oxygen (6 L), CAD s/p RCA stent, hx ischemic bowel surgery who presents with cough, wheezing, and acute shortness of breath. Patient had her first chemo treatment last week. As per , she has been coughing up blood at times. Patient presents in acute respiratory distress and was hypoxic to 80's on RA. Patient placed on immediate BIPAP after being rushed into Critical Care ED. Appropriate labs sent, EKG, CXR.  Patient had CT scan of chest to evaluate lung and for PE. Respiratory status improved with BIPAP.  Patient found to have multi-focal pneumonia and treated for HCAP. No PE on CT scan of chest.    ICU admission for sepsis, multifocal pneumonia, and respiratory distress.

## 2018-09-23 NOTE — ED PROVIDER NOTE - NS ED ROS FT
Constitutional: See HPI.  Eyes: No visual changes, eye pain or discharge.  ENMT: No hearing changes, pain, discharge or infections. No neck pain or stiffness.  Cardiac: +SOB; No chest pain or edema. No chest pain with exertion.  Respiratory: +respiratory distress; No cough   GI: No nausea, vomiting, diarrhea or abdominal pain.  : No dysuria, frequency or burning.  MS: No myalgia, muscle weakness, joint pain or back pain.  Neuro: No headache or weakness. No LOC.  Skin: No skin rash.  Endo: No hx of DM, thyroid disease  Except as documented in HPI, all other review of systems is negative

## 2018-09-23 NOTE — H&P ADULT - NSHPREVIEWOFSYSTEMS_GEN_ALL_CORE
CONSTITUTIONAL: No fever, weight loss, or fatigue  EYES: No eye pain, visual disturbances, or discharge  ENMT:  No difficulty hearing, tinnitus, vertigo; No sinus or throat pain  NECK: No pain or stiffness  RESPIRATORY: No cough. (+) wheezing, hemoptysis, shortness of breath  CARDIOVASCULAR: No chest pain, palpitations, dizziness, or leg swelling  GASTROINTESTINAL: No abdominal or epigastric pain. No nausea, vomiting, or hematemesis; No diarrhea or constipation. No melena or hematochezia.  GENITOURINARY: No dysuria, frequency, hematuria, or incontinence  NEUROLOGICAL: No headaches, memory loss, loss of strength, numbness, or tremors  SKIN: No itching, burning, rashes, or lesions   LYMPH NODES: No enlarged glands  ENDOCRINE: No heat or cold intolerance; No hair loss  MUSCULOSKELETAL: No joint pain or swelling; No muscle, back, or extremity pain  PSYCHIATRIC: No depression, anxiety, mood swings, or difficulty sleeping  HEME/LYMPH: No easy bruising, or bleeding gums  ALLERY AND IMMUNOLOGIC: No hives or eczema

## 2018-09-23 NOTE — ED ADULT TRIAGE NOTE - CHIEF COMPLAINT QUOTE
BIBA from home with difficulty breathing/ pt. has stage 4 lung ca, started first chemotherapy on Friday/ O2 dependent

## 2018-09-23 NOTE — ED PROVIDER NOTE - ATTENDING CONTRIBUTION TO CARE
73 y/o female PMH HTN, stage 4 lung CA, COPD on home oxygen (6 L) who presents with cough, wheezing, and acute shortness of breath. Patient had her first chemo treatment last week.  Patient presents in acute respiratory distress and was hypoxic to 80's on RA. Patient placed on immediate BIPAP after being rushed into Critical Care ED. Appropriate labs sent, EKG, CXR.  Patient had CT scan of chest to evaluate lung and for PE. Respiratory status improving with BIPAP.  VBG reviewed. Patient found to have multi-focal pneumonia and treated for HCAP. 73 y/o female PMH HTN, stage 4 lung CA, COPD on home oxygen (6 L) who presents with cough, wheezing, and acute shortness of breath. Patient had her first chemo treatment last week.  Patient presents in acute respiratory distress and was hypoxic to 80's on RA. Patient placed on immediate BIPAP after being rushed into Critical Care ED. Appropriate labs sent, EKG, CXR.  Patient had CT scan of chest to evaluate lung and for PE. Respiratory status improving with BIPAP.  VBG reviewed. Patient found to have multi-focal pneumonia and treated for HCAP. Patient checked on multiple times.  no PE on CT scan of chest.  ICU consutled for evaluation of sepsis, multifocal pneumonia, and respiratory distress.  They recommend admission to ICU.

## 2018-09-23 NOTE — ED PROVIDER NOTE - PHYSICAL EXAMINATION
CONSTITUTIONAL: Well-developed; well-nourished; in moderate respiratory distress   SKIN: warm, dry  HEAD: Normocephalic; atraumatic.  EYES: no conj injection  ENT: No nasal discharge; airway clear.  NECK: Supple; non tender.  CARD: S1, S2 normal; no murmurs, gallops, or rubs. Regular rate and rhythm.   RESP: +b/l wheezing in all lung fields   ABD: soft ntnd  EXT: Normal ROM.  No clubbing, cyanosis or edema.   NEURO: Alert, oriented, grossly unremarkable  PSYCH: Cooperative, appropriate.

## 2018-09-23 NOTE — ED PROVIDER NOTE - CRITICAL CARE PROVIDED
additional history taking/consult w/ pt's family directly relating to pts condition/documentation/interpretation of diagnostic studies/consultation with other physicians

## 2018-09-23 NOTE — H&P ADULT - NSHPPHYSICALEXAM_GEN_ALL_CORE
GENERAL: slightly tachypneic  HEAD:  NCAT  ENMT: No tonsillar erythema, exudates, or enlargement; Moist mucous membranes, Good dentition, No lesions  NECK: Supple, No JVD, Normal thyroid  NERVOUS SYSTEM: AAOX4, Good concentration; Motor Strength 5/5 B/L upper and lower extremities; DTRs 2+ intact and symmetric  CHEST/LUNG: b/l wheezes.  HEART: +s1s2 RRR no m/g/r  ABDOMEN: soft, NT/ND (+) bs, no HSM  EXTREMITIES:  2+ Peripheral Pulses, No c/c/e  LYMPH: No lymphadenopathy noted  SKIN: No rashes or lesions

## 2018-09-23 NOTE — H&P ADULT - NSHPLABSRESULTS_GEN_ALL_CORE
Labs:                        9.6    22.49 )-----------( 355      ( 23 Sep 2018 09:32 )             29.7                                   09-23    138  |  96<L>  |  15  ----------------------------<  150<H>  4.4   |  22  |  0.8    Ca    9.7      23 Sep 2018 09:32    TPro  5.9<L>  /  Alb  2.6<L>  /  TBili  0.3  /  DBili  x   /  AST  35  /  ALT  18  /  AlkPhos  97  09-23    LIVER FUNCTIONS - ( 23 Sep 2018 09:32 )  Alb: 2.6 g/dL / Pro: 5.9 g/dL / ALK PHOS: 97 U/L / ALT: 18 U/L / AST: 35 U/L / GGT: x                                      PT/INR - ( 23 Sep 2018 09:32 )   PT: 14.50 sec;   INR: 1.35 ratio         PTT - ( 23 Sep 2018 09:32 )  PTT:32.9 sec  CARDIAC MARKERS ( 23 Sep 2018 09:32 )  x     / 0.02 ng/mL / x     / x     / x        Lactate, Blood: 2.2 mmol/L (09-23-18 @ 09:32)    Lactate, Blood: 2.2 mmol/L (09-23-18 @ 09:32)  Serum Pro-Brain Natriuretic Peptide: 1494 pg/mL (09-23-18 @ 09:32)       Troponin T, Serum: 0.02 ng/mL (09-23-18 @ 09:32)    Serum Pro-Brain Natriuretic Peptide: 1494 pg/mL (09-23-18 @ 09:32)    < from: CT Chest w/ IV Cont (09.23.18 @ 11:54) >    IMPRESSION:     No central or lobar pulmonary embolism.    Bilateral multifocal patchy airspace opacities, compatible with   pneumonia. Small left pleural effusion.    Diffuse mediastinal lymphadenopathy, as well as conglomerate soft tissue   density within the subcarinal region, measuring up to 4.5 x 3.4 cm   (series 7 image 133). This causes mild mass effect upon the left mainstem   bronchus, although it still remains patent. Additionally there is more   significant mass effect upon the left atrium, just distal to the ostia of   the left superior and inferior pulmonary veins.  Cannot exclude cardiac   invasion.    1.4 cm right supraclavicular lymph node (series 8 image 35),     Bilateral upper lobe solid nodules measuring up to 1.1 cm.    < end of copied text >

## 2018-09-23 NOTE — ED ADULT NURSE NOTE - PMH
H/O heart surgery  STENT PLACEMENT BYPASS AORTA CELIAC STENT  HTN (hypertension)    Lung cancer    MI (myocardial infarction)

## 2018-09-23 NOTE — ED PROVIDER NOTE - MEDICAL DECISION MAKING DETAILS
73 y/o female PMH HTN, stage 4 lung CA, COPD on home oxygen (6 L) who presents with cough, wheezing, and acute shortness of breath. Patient had her first chemo treatment last week.  Patient presents in acute respiratory distress and was hypoxic to 80's on RA. Patient placed on immediate BIPAP after being rushed into Critical Care ED. Appropriate labs sent, EKG, CXR.  Patient had CT scan of chest to evaluate lung and for PE. Respiratory status improving with BIPAP.  VBG reviewed. Patient found to have multi-focal pneumonia and treated for HCAP. Patient checked on multiple times.  no PE on CT scan of chest.  ICU consutled for evaluation of sepsis, multifocal pneumonia, and respiratory distress.  They recommend admission to ICU.

## 2018-09-23 NOTE — H&P ADULT - NSHPOUTPATIENTPROVIDERS_GEN_ALL_CORE
PMD: Does not have one since she alternates between [place] and [place]  Cardio: Dr. Freeman  Pulmonary: Dr. Dupree  Onc: Dr. Peña

## 2018-09-23 NOTE — ED PROVIDER NOTE - CARE PLAN
Principal Discharge DX:	Multifocal pneumonia Principal Discharge DX:	Multifocal pneumonia  Secondary Diagnosis:	Sepsis, due to unspecified organism  Secondary Diagnosis:	Acute respiratory distress

## 2018-09-23 NOTE — H&P ADULT - FAMILY HISTORY
Father  Still living? No  Family history of early CAD, Age at diagnosis: Age Unknown     Child  Still living? Yes, Estimated age: 41-50  Family history of diabetes mellitus, Age at diagnosis: Age Unknown

## 2018-09-23 NOTE — ED PROVIDER NOTE - OBJECTIVE STATEMENT
71 y/o female with pmhx of HTN, stage 4 lung CA, 71 y/o female with pmhx of HTN, stage 4 lung CA, presents with worsening SOB. Patient recently started chemotherapy for lung CA 3 days ago, the following day after (2 days ago), patient began to have worsening SOB. Patient normally needs 5L O2 NC. 71 y/o female with pmhx of HTN, stage 4 lung CA, presents with worsening SOB. Patient recently started chemotherapy for lung CA 3 days ago, the following day after (2 days ago), patient began to have worsening SOB. Patient normally needs 5L O2 NC, however today, she measured her pulse ox at home and it was 60% on 5L NC. Patient denies fevers/chills, chest pain, leg swelling/tenderness.

## 2018-09-23 NOTE — H&P ADULT - PMH
Anxiety    Dyslipidemia    H/O heart surgery  STENT PLACEMENT BYPASS AORTA CELIAC STENT  HTN (hypertension)    Lung cancer    MI (myocardial infarction)    Pulmonary embolism

## 2018-09-23 NOTE — H&P ADULT - ASSESSMENT
IMPRESSION:  Sepsis 2/2 multifocal PNA  hx b/l PE    PLAN:    CNS: no sedation    HEENT: Oral care    PULMONARY:  HOB @ 45 degrees. BiPAP PRN for now.     CARDIOVASCULAR: monitor bp. c/w home meds    GI: GI prophylaxis.  Feeding as tolerated.    RENAL:  Follow up lytes.  Correct as needed    INFECTIOUS DISEASE: Follow up panculture. c/w levaquin, cefepime    HEMATOLOGICAL:  DVT prophylaxis. c/w lovenox BID    ENDOCRINE:  Follow up FS.  Insulin protocol if needed.      MUSCULOSKELETAL: increase activity as tolerated    ICU for now  FULL CODE: pt and  deciding on DNR/DNI; has papers but did not bring them. IMPRESSION:  Sepsis 2/2 multifocal PNA  hx b/l PE    PLAN:    CNS: no sedation    HEENT: Oral care    PULMONARY:  HOB @ 45 degrees. BiPAP PRN for now. nebs q6h and PRN    CARDIOVASCULAR: monitor bp. c/w home meds    GI: GI prophylaxis.  Feeding as tolerated.    RENAL:  Follow up lytes.  Correct as needed    INFECTIOUS DISEASE: Follow up panculture. c/w levaquin, cefepime    HEMATOLOGICAL:  DVT prophylaxis. c/w lovenox BID    ENDOCRINE:  Follow up FS.  Insulin protocol if needed.      MUSCULOSKELETAL: increase activity as tolerated    ICU for now  FULL CODE: pt and  deciding on DNR/DNI; has papers but did not bring them. IMPRESSION:  Sepsis 2/2 multifocal PNA  hx b/l PE    PLAN:    CNS: no sedation    HEENT: Oral care    PULMONARY:  HOB @ 45 degrees. BiPAP PRN for now. nebs q6h and PRN. f/u duplex, CXR in AM. f/u ABG    CARDIOVASCULAR: monitor bp. c/w home meds    GI: GI prophylaxis.  Feeding as tolerated.    RENAL:  Follow up lytes.  Correct as needed    INFECTIOUS DISEASE: Follow up panculture. c/w levaquin, cefepime    HEMATOLOGICAL:  DVT prophylaxis. c/w lovenox BID    ENDOCRINE:  Follow up FS.  Insulin protocol if needed.      MUSCULOSKELETAL: increase activity as tolerated    ICU for now  FULL CODE: pt and  deciding on DNR/DNI; has papers but did not bring them. IMPRESSION:  Sepsis 2/2 multifocal PNA  hx b/l PE    PLAN:    CNS: no sedation    HEENT: Oral care    PULMONARY:  HOB @ 45 degrees. BiPAP PRN for now. nebs q6h and PRN. f/u duplex, CXR in AM. f/u ABG    CARDIOVASCULAR: monitor bp. c/w home meds, f/u TTE    GI: GI prophylaxis.  Feeding as tolerated.    RENAL:  Follow up lytes.  Correct as needed    INFECTIOUS DISEASE: Follow up panculture. c/w levaquin, cefepime    HEMATOLOGICAL:  DVT prophylaxis. c/w lovenox BID    ENDOCRINE:  Follow up FS.  Insulin protocol if needed.      MUSCULOSKELETAL: increase activity as tolerated    ICU for now  FULL CODE: pt and  deciding on DNR/DNI; has papers but did not bring them.

## 2018-09-24 LAB
ALBUMIN SERPL ELPH-MCNC: 2.8 G/DL
ALP BLD-CCNC: 88 U/L
ALT SERPL-CCNC: 18 U/L
ANION GAP SERPL CALC-SCNC: 14 MMOL/L — SIGNIFICANT CHANGE UP (ref 7–14)
ANION GAP SERPL CALC-SCNC: 16 MMOL/L
AST SERPL-CCNC: 54 U/L
BASE EXCESS BLDA CALC-SCNC: 3.3 MMOL/L — HIGH (ref -2–2)
BASOPHILS # BLD AUTO: 0.02 K/UL — SIGNIFICANT CHANGE UP (ref 0–0.2)
BASOPHILS NFR BLD AUTO: 0.1 % — SIGNIFICANT CHANGE UP (ref 0–1)
BILIRUB SERPL-MCNC: 0.3 MG/DL
BUN SERPL-MCNC: 16 MG/DL
BUN SERPL-MCNC: 19 MG/DL — SIGNIFICANT CHANGE UP (ref 10–20)
CALCIUM SERPL-MCNC: 10.1 MG/DL — SIGNIFICANT CHANGE UP (ref 8.5–10.1)
CALCIUM SERPL-MCNC: 9.6 MG/DL
CHLORIDE SERPL-SCNC: 94 MMOL/L
CHLORIDE SERPL-SCNC: 98 MMOL/L — SIGNIFICANT CHANGE UP (ref 98–110)
CO2 SERPL-SCNC: 24 MMOL/L — SIGNIFICANT CHANGE UP (ref 17–32)
CO2 SERPL-SCNC: 25 MMOL/L
CREAT SERPL-MCNC: 0.8 MG/DL
CREAT SERPL-MCNC: 0.9 MG/DL — SIGNIFICANT CHANGE UP (ref 0.7–1.5)
EOSINOPHIL # BLD AUTO: 0 K/UL — SIGNIFICANT CHANGE UP (ref 0–0.7)
EOSINOPHIL NFR BLD AUTO: 0 % — SIGNIFICANT CHANGE UP (ref 0–8)
GLUCOSE BLDC GLUCOMTR-MCNC: 118 MG/DL — HIGH (ref 70–99)
GLUCOSE BLDC GLUCOMTR-MCNC: 162 MG/DL — HIGH (ref 70–99)
GLUCOSE SERPL-MCNC: 117 MG/DL — HIGH (ref 70–99)
GLUCOSE SERPL-MCNC: 152 MG/DL
HCO3 BLDA-SCNC: 26 MMOL/L — SIGNIFICANT CHANGE UP (ref 23–27)
HCT VFR BLD CALC: 29.4 % — LOW (ref 37–47)
HCT VFR BLD CALC: 30.5 %
HGB BLD-MCNC: 9.4 G/DL — LOW (ref 12–16)
HGB BLD-MCNC: 9.7 G/DL
IMM GRANULOCYTES NFR BLD AUTO: 0.6 % — HIGH (ref 0.1–0.3)
LYMPHOCYTES # BLD AUTO: 0.95 K/UL — LOW (ref 1.2–3.4)
LYMPHOCYTES # BLD AUTO: 4.9 % — LOW (ref 20.5–51.1)
MAGNESIUM SERPL-MCNC: 2 MG/DL — SIGNIFICANT CHANGE UP (ref 1.8–2.4)
MCHC RBC-ENTMCNC: 26.7 PG — LOW (ref 27–31)
MCHC RBC-ENTMCNC: 26.8 PG
MCHC RBC-ENTMCNC: 31.8 G/DL
MCHC RBC-ENTMCNC: 32 G/DL — SIGNIFICANT CHANGE UP (ref 32–37)
MCV RBC AUTO: 83.5 FL — SIGNIFICANT CHANGE UP (ref 81–99)
MCV RBC AUTO: 84.3 FL
MONOCYTES # BLD AUTO: 0.67 K/UL — HIGH (ref 0.1–0.6)
MONOCYTES NFR BLD AUTO: 3.5 % — SIGNIFICANT CHANGE UP (ref 1.7–9.3)
MRSA PCR RESULT.: NEGATIVE — SIGNIFICANT CHANGE UP
NEUTROPHILS # BLD AUTO: 17.53 K/UL — HIGH (ref 1.4–6.5)
NEUTROPHILS NFR BLD AUTO: 90.9 % — HIGH (ref 42.2–75.2)
NRBC # BLD: 0 /100 WBCS — SIGNIFICANT CHANGE UP (ref 0–0)
PCO2 BLDA: 34 MMHG — LOW (ref 38–42)
PH BLDA: 7.5 — HIGH (ref 7.38–7.42)
PLATELET # BLD AUTO: 316 K/UL
PLATELET # BLD AUTO: 354 K/UL — SIGNIFICANT CHANGE UP (ref 130–400)
PMV BLD: 8.6 FL
PO2 BLDA: 45 MMHG — LOW (ref 78–95)
POTASSIUM SERPL-MCNC: 4.7 MMOL/L — SIGNIFICANT CHANGE UP (ref 3.5–5)
POTASSIUM SERPL-SCNC: 4.7 MMOL/L — SIGNIFICANT CHANGE UP (ref 3.5–5)
POTASSIUM SERPL-SCNC: 5.2 MMOL/L
PROT SERPL-MCNC: 6.1 G/DL
RBC # BLD: 3.52 M/UL — LOW (ref 4.2–5.4)
RBC # BLD: 3.62 M/UL
RBC # FLD: 19.4 % — HIGH (ref 11.5–14.5)
RBC # FLD: 19.6 %
SAO2 % BLDA: 83 % — LOW (ref 94–98)
SODIUM SERPL-SCNC: 135 MMOL/L
SODIUM SERPL-SCNC: 136 MMOL/L — SIGNIFICANT CHANGE UP (ref 135–146)
TSH SERPL-ACNC: 0.87 UIU/ML
WBC # BLD: 19.29 K/UL — HIGH (ref 4.8–10.8)
WBC # FLD AUTO: 19.29 K/UL — HIGH (ref 4.8–10.8)
WBC # FLD AUTO: 19.98 K/UL

## 2018-09-24 RX ORDER — VANCOMYCIN HCL 1 G
750 VIAL (EA) INTRAVENOUS EVERY 12 HOURS
Qty: 0 | Refills: 0 | Status: DISCONTINUED | OUTPATIENT
Start: 2018-09-24 | End: 2018-09-24

## 2018-09-24 RX ORDER — PANTOPRAZOLE SODIUM 20 MG/1
40 TABLET, DELAYED RELEASE ORAL ONCE
Qty: 0 | Refills: 0 | Status: COMPLETED | OUTPATIENT
Start: 2018-09-24 | End: 2018-09-24

## 2018-09-24 RX ORDER — IPRATROPIUM/ALBUTEROL SULFATE 18-103MCG
3 AEROSOL WITH ADAPTER (GRAM) INHALATION EVERY 4 HOURS
Qty: 0 | Refills: 0 | Status: DISCONTINUED | OUTPATIENT
Start: 2018-09-24 | End: 2018-09-26

## 2018-09-24 RX ORDER — PANTOPRAZOLE SODIUM 20 MG/1
40 TABLET, DELAYED RELEASE ORAL DAILY
Qty: 0 | Refills: 0 | Status: DISCONTINUED | OUTPATIENT
Start: 2018-09-24 | End: 2018-09-27

## 2018-09-24 RX ORDER — VANCOMYCIN HCL 1 G
VIAL (EA) INTRAVENOUS
Qty: 0 | Refills: 0 | Status: DISCONTINUED | OUTPATIENT
Start: 2018-09-24 | End: 2018-09-24

## 2018-09-24 RX ORDER — VANCOMYCIN HCL 1 G
750 VIAL (EA) INTRAVENOUS ONCE
Qty: 0 | Refills: 0 | Status: DISCONTINUED | OUTPATIENT
Start: 2018-09-24 | End: 2018-09-24

## 2018-09-24 RX ORDER — VANCOMYCIN HCL 1 G
1000 VIAL (EA) INTRAVENOUS EVERY 12 HOURS
Qty: 0 | Refills: 0 | Status: DISCONTINUED | OUTPATIENT
Start: 2018-09-24 | End: 2018-09-24

## 2018-09-24 RX ORDER — VANCOMYCIN HCL 1 G
750 VIAL (EA) INTRAVENOUS
Qty: 0 | Refills: 0 | Status: DISCONTINUED | OUTPATIENT
Start: 2018-09-24 | End: 2018-09-28

## 2018-09-24 RX ORDER — SODIUM CHLORIDE 9 MG/ML
1000 INJECTION, SOLUTION INTRAVENOUS
Qty: 0 | Refills: 0 | Status: DISCONTINUED | OUTPATIENT
Start: 2018-09-24 | End: 2018-09-24

## 2018-09-24 RX ORDER — SODIUM CHLORIDE 9 MG/ML
1000 INJECTION, SOLUTION INTRAVENOUS
Qty: 0 | Refills: 0 | Status: DISCONTINUED | OUTPATIENT
Start: 2018-09-24 | End: 2018-09-26

## 2018-09-24 RX ORDER — VANCOMYCIN HCL 1 G
500 VIAL (EA) INTRAVENOUS
Qty: 0 | Refills: 0 | Status: DISCONTINUED | OUTPATIENT
Start: 2018-09-24 | End: 2018-09-28

## 2018-09-24 RX ADMIN — Medication 3 MILLILITER(S): at 20:52

## 2018-09-24 RX ADMIN — Medication 80 MILLIGRAM(S): at 14:44

## 2018-09-24 RX ADMIN — CEFEPIME 100 MILLIGRAM(S): 1 INJECTION, POWDER, FOR SOLUTION INTRAMUSCULAR; INTRAVENOUS at 05:31

## 2018-09-24 RX ADMIN — Medication 3 MILLILITER(S): at 09:21

## 2018-09-24 RX ADMIN — PANTOPRAZOLE SODIUM 40 MILLIGRAM(S): 20 TABLET, DELAYED RELEASE ORAL at 06:04

## 2018-09-24 RX ADMIN — CHLORHEXIDINE GLUCONATE 1 APPLICATION(S): 213 SOLUTION TOPICAL at 05:31

## 2018-09-24 RX ADMIN — Medication 80 MILLIGRAM(S): at 21:08

## 2018-09-24 RX ADMIN — Medication 1 MILLIGRAM(S): at 21:08

## 2018-09-24 RX ADMIN — ENOXAPARIN SODIUM 70 MILLIGRAM(S): 100 INJECTION SUBCUTANEOUS at 05:31

## 2018-09-24 RX ADMIN — Medication 250 MILLIGRAM(S): at 13:01

## 2018-09-24 RX ADMIN — ENOXAPARIN SODIUM 70 MILLIGRAM(S): 100 INJECTION SUBCUTANEOUS at 17:50

## 2018-09-24 RX ADMIN — SODIUM CHLORIDE 75 MILLILITER(S): 9 INJECTION, SOLUTION INTRAVENOUS at 10:00

## 2018-09-24 RX ADMIN — Medication 100 MILLIGRAM(S): at 17:50

## 2018-09-24 RX ADMIN — PANTOPRAZOLE SODIUM 40 MILLIGRAM(S): 20 TABLET, DELAYED RELEASE ORAL at 12:17

## 2018-09-24 RX ADMIN — Medication 1 MILLIGRAM(S): at 13:01

## 2018-09-24 RX ADMIN — Medication 3 MILLILITER(S): at 15:57

## 2018-09-24 NOTE — CONSULT NOTE ADULT - SUBJECTIVE AND OBJECTIVE BOX
Patient is a 72y old  Female who presents with a chief complaint of SOB, wheezing (23 Sep 2018 14:55)      HPI:  73 y/o lady with PMH HTN, stage 4 lung CA, b/l PE, hx aortofemoral bypass, hx celiac artery stent, hx SMA stent COPD on home oxygen (6 L), CAD s/p RCA stent, hx ischemic bowel surgery who presents with cough, wheezing, and acute shortness of breath. Patient had her first chemo treatment last week. As per , she has been coughing up blood at times. Patient presents in acute respiratory distress and was hypoxic to 80's on RA. Patient placed on immediate BIPAP after being rushed into Critical Care ED. Appropriate labs sent, EKG, CXR.  Patient had CT scan of chest to evaluate lung and for PE. Respiratory status improved with BIPAP.  Patient found to have multi-focal pneumonia and treated for HCAP. No PE on CT scan of chest.    ICU admission for sepsis, multifocal pneumonia, and respiratory distress. (23 Sep 2018 14:55)      PAST MEDICAL & SURGICAL HISTORY:  Anxiety  Dyslipidemia  Pulmonary embolism  Lung cancer  H/O heart surgery: STENT PLACEMENT BYPASS AORTA CELIAC STENT  MI (myocardial infarction)  HTN (hypertension)  Aortocoronary bypass status  Altered bowel function: IN PAST HAD BOWEL SURGERY      SOCIAL HX:   Smoking     x SMOKER                     ETOH     NO                      Other    FAMILY HISTORY:  Family history of diabetes mellitus (Child)  Family history of early CAD (Father)  :  No known cardiovacular family hisotry     ROS:  See HPI     Allergies    No Known Allergies    Intolerances          PHYSICAL EXAM    ICU Vital Signs Last 24 Hrs  T(C): 35.6 (24 Sep 2018 08:00), Max: 37.7 (23 Sep 2018 09:48)  T(F): 96 (24 Sep 2018 08:00), Max: 99.9 (23 Sep 2018 09:48)  HR: 100 (24 Sep 2018 08:00) (82 - 118)  BP: 138/65 (24 Sep 2018 08:00) (96/46 - 138/69)  BP(mean): 93 (24 Sep 2018 08:00) (58 - 102)  ABP: --  ABP(mean): --  RR: 43 (24 Sep 2018 08:00) (18 - 43)  SpO2: 85% (24 Sep 2018 08:00) (85% - 100%)      General: In NAD   HEENT:  HAFSA              Lymphatic system: No cervical LN   Lungs: Bilateral BS  Cardiovascular: Regular  Gastrointestinal: Soft, Positive BS  Musculoskeletal: No clubbing.  Moves all extremities.  Full range of motion   Skin: Warm.  Intact  Neurological: No motor or sensory deficit       09-23-18 @ 07:01  -  09-24-18 @ 07:00  --------------------------------------------------------  IN:  Total IN: 0 mL    OUT:    Indwelling Catheter - Urethral: 333 mL  Total OUT: 333 mL    Total NET: -333 mL      09-24-18 @ 07:01  -  09-24-18 @ 09:12  --------------------------------------------------------  IN:  Total IN: 0 mL    OUT:    Indwelling Catheter - Urethral: 25 mL  Total OUT: 25 mL    Total NET: -25 mL          LABS:                          9.4    19.29 )-----------( 354      ( 24 Sep 2018 05:02 )             29.4                                               09-24    136  |  98  |  19  ----------------------------<  117<H>  4.7   |  24  |  0.9    Ca    10.1      24 Sep 2018 05:02  Mg     2.0     09-24    TPro  5.9<L>  /  Alb  2.6<L>  /  TBili  0.3  /  DBili  x   /  AST  35  /  ALT  18  /  AlkPhos  97  09-23      PT/INR - ( 23 Sep 2018 09:32 )   PT: 14.50 sec;   INR: 1.35 ratio         PTT - ( 23 Sep 2018 09:32 )  PTT:32.9 sec                                           CARDIAC MARKERS ( 23 Sep 2018 21:28 )  x     / <0.01 ng/mL / 46 U/L / x     / 3.2 ng/mL  CARDIAC MARKERS ( 23 Sep 2018 16:46 )  x     / 0.02 ng/mL / 45 U/L / x     / 3.4 ng/mL  CARDIAC MARKERS ( 23 Sep 2018 09:32 )  x     / 0.02 ng/mL / x     / x     / x                                                LIVER FUNCTIONS - ( 23 Sep 2018 09:32 )  Alb: 2.6 g/dL / Pro: 5.9 g/dL / ALK PHOS: 97 U/L / ALT: 18 U/L / AST: 35 U/L / GGT: x                                                                                                                                       X-Rays               Bilateral infiltrates                                                                     ECHO    MEDICATIONS  (STANDING):  ALBUTerol/ipratropium for Nebulization 3 milliLiter(s) Nebulizer every 6 hours  buDESOnide 160 MICROgram(s)/formoterol 4.5 MICROgram(s) Inhaler 2 Puff(s) Inhalation two times a day  cefepime   IVPB      cefepime   IVPB 2000 milliGRAM(s) IV Intermittent every 12 hours  chlorhexidine 4% Liquid 1 Application(s) Topical <User Schedule>  enoxaparin Injectable 70 milliGRAM(s) SubCutaneous every 12 hours  levoFLOXacin IVPB 750 milliGRAM(s) IV Intermittent every 24 hours  levoFLOXacin IVPB      LORazepam     Tablet 1 milliGRAM(s) Oral three times a day  multivitamin 1 Tablet(s) Oral daily    MEDICATIONS  (PRN):  ALBUTerol    90 MICROgram(s) HFA Inhaler 2 Puff(s) Inhalation every 4 hours PRN Shortness of Breath

## 2018-09-24 NOTE — CONSULT NOTE ADULT - ASSESSMENT
IMPRESSION:    Acute hypoxemic respiratory failure   HCAP VS pneumonitis  HO Stage IV squamous cell carcinoma  COPD exacerbation  HO PE    PLAN:    CNS: No depressants    HEENT: Oral care    PULMONARY:  HOB @ 45 degrees.  Nebs Q 4.  Solumedrol 80 mg Q8    CARDIOVASCULAR: IVF with LR 75 cc per hour    GI: GI prophylaxis.  NPO for now     RENAL:  Follow up lytes.  Correct as needed    INFECTIOUS DISEASE: Follow up cultures.  Add Vancomycin.      HEMATOLOGICAL:  DVT TX    ENDOCRINE:  Follow up FS.  Insulin protocol if needed.    MUSCULOSKELETAL:    Low threshold for intubation     Keep Donato for now    AI positive

## 2018-09-24 NOTE — PROGRESS NOTE ADULT - ASSESSMENT
73 y/o lady with PMH HTN, stage 4 lung CA, b/l PE, hx aortofemoral bypass, hx celiac artery stent, hx SMA stent COPD on home oxygen (6 L), CAD s/p RCA stent, hx ischemic bowel surgery who presents with cough, wheezing, and acute shortness of breath. Patient had her first chemo treatment last week. As per , she has been coughing up blood at times. Patient presents in acute respiratory distress and was hypoxic to 80's on RA. Patient placed on immediate BIPAP after being rushed into Critical Care ED. Appropriate labs sent, EKG, CXR.  Patient had CT scan of chest to evaluate lung and for PE. Respiratory status improved with BIPAP.  Patient found to have multi-focal pneumonia and treated for HCAP. No PE on CT scan of chest.    ICU admission for sepsis, multifocal pneumonia, and respiratory distress. 71 y/o lady with PMH HTN, stage 4 lung CA, b/l PE, hx aortofemoral bypass, hx celiac artery stent, hx SMA stent COPD on home oxygen (6 L), CAD s/p RCA stent, hx ischemic bowel surgery who presents with cough, wheezing, and acute shortness of breath. Patient had her first chemo treatment last week. As per , she has been coughing up blood at times. Patient presents in acute respiratory distress and was hypoxic to 80's on RA. Patient placed on immediate BIPAP after being rushed into Critical Care ED. Appropriate labs sent, EKG, CXR.  Patient had CT scan of chest to evaluate lung and for PE. Respiratory status improved with BIPAP.  Patient found to have multi-focal pneumonia and treated for HCAP. No PE on CT scan of chest.    ICU admission for sepsis, multifocal pneumonia, and respiratory distress.    Acute hypoxemic respiratory failure  - on BiPAP 13/8  - will try trial of HFNC and perform ABG in 2 hours    HCAP vs chemo related pneumonitis  - CT w/ IVC 9/23 w/ bilateral multifocal patchy airspace opacities consistent with pneumonia  - CXR 9/23 Bilateral, left greater than right interstitial and airspace opacities, new since prior.  - s/w solumedrol 80mg q8h  - s/w vancomycin   - low threshold for intubation    HO Stage IV squamous cell carcinoma      COPD exacerbation  HO PE 73 y/o lady with PMH HTN, stage 4 lung CA, b/l PE, hx aortofemoral bypass, hx celiac artery stent, hx SMA stent COPD on home oxygen (6 L), CAD s/p RCA stent, hx ischemic bowel surgery who presents with cough, wheezing, and acute shortness of breath. Patient had her first chemo treatment last week. As per , she has been coughing up blood at times. Patient presents in acute respiratory distress and was hypoxic to 80's on RA. Patient placed on immediate BIPAP after being rushed into Critical Care ED. Appropriate labs sent, EKG, CXR.  Patient had CT scan of chest to evaluate lung and for PE. Respiratory status improved with BIPAP.  Patient found to have multi-focal pneumonia and treated for HCAP. No PE on CT scan of chest.    ICU admission for sepsis, multifocal pneumonia, and respiratory distress.    Acute hypoxemic respiratory failure  - on BiPAP 13/8  - will try trial of HFNC and perform ABG in 2 hours    HCAP vs chemo related pneumonitis. h/o Stage IV squamous cell carcinoma  - CT w/ IVC 9/23 w/ bilateral multifocal patchy airspace opacities consistent with pneumonia  - CXR 9/23 Bilateral, left greater than right interstitial and airspace opacities, new since prior.  - s/w vancomycin   - low threshold for intubation    COPD exacerbation  - on home oxygen (6L)  - s/w nebs q4h  - s/w solumedrol 80mg q8h    GI PPx: protonix IV qd  DVT PPx: lovenox 70 bid

## 2018-09-24 NOTE — PROGRESS NOTE ADULT - SUBJECTIVE AND OBJECTIVE BOX
CHIEF COMPLAINT:  Patient is a 72y old Female who presents with a chief complaint of SOB, wheezing (24 Sep 2018 09:12)    Currently admitted to medicine with the primary diagnosis of Multifocal pneumonia     Today is hospital day 1d. This morning she is resting comfortably in bed and reports no new issues or overnight events.     PAST MEDICAL & SURGICAL HISTORY  Anxiety  Dyslipidemia  Pulmonary embolism  Lung cancer  H/O heart surgery: STENT PLACEMENT BYPASS AORTA CELIAC STENT  MI (myocardial infarction)  HTN (hypertension)  Aortocoronary bypass status  Altered bowel function: IN PAST HAD BOWEL SURGERY    SOCIAL HISTORY:  Negative for smoking/alcohol/drug use.     ALLERGIES:  No Known Allergies    MEDICATIONS:  STANDING MEDICATIONS  ALBUTerol/ipratropium for Nebulization 3 milliLiter(s) Nebulizer every 4 hours  buDESOnide 160 MICROgram(s)/formoterol 4.5 MICROgram(s) Inhaler 2 Puff(s) Inhalation two times a day  chlorhexidine 4% Liquid 1 Application(s) Topical <User Schedule>  enoxaparin Injectable 70 milliGRAM(s) SubCutaneous every 12 hours  lactated ringers. 1000 milliLiter(s) IV Continuous <Continuous>  LORazepam   Injectable 1 milliGRAM(s) IV Push three times a day  methylPREDNISolone sodium succinate Injectable 80 milliGRAM(s) IV Push every 8 hours  multivitamin 1 Tablet(s) Oral daily  pantoprazole  Injectable 40 milliGRAM(s) IV Push daily  vancomycin  IVPB 750 milliGRAM(s) IV Intermittent <User Schedule>  vancomycin  IVPB 500 milliGRAM(s) IV Intermittent <User Schedule>    PRN MEDICATIONS  ALBUTerol    90 MICROgram(s) HFA Inhaler 2 Puff(s) Inhalation every 4 hours PRN    VITALS:   T(F): 98.4  HR: 92  BP: 148/57  RR: 35  SpO2: 94%    LABS:             9.4    19.29 )-----------( 354      ( 24 Sep 2018 05:02 )             29.4     09-24    136  |  98  |  19  ----------------------------<  117<H>  4.7   |  24  |  0.9    Ca    10.1      24 Sep 2018 05:02  Mg     2.0     09-24    TPro  5.9<L>  /  Alb  2.6<L>  /  TBili  0.3  /  DBili  x   /  AST  35  /  ALT  18  /  AlkPhos  97  09-23    PT/INR - ( 23 Sep 2018 09:32 )   PT: 14.50 sec;   INR: 1.35 ratio    PTT - ( 23 Sep 2018 09:32 )  PTT:32.9 sec    Creatine Kinase, Serum: 46 U/L (09-23-18 @ 21:28)  Troponin T, Serum: <0.01 ng/mL (09-23-18 @ 21:28)  Creatine Kinase, Serum: 45 U/L (09-23-18 @ 16:46)  Troponin T, Serum: 0.02 ng/mL <H> (09-23-18 @ 16:46)    CARDIAC MARKERS ( 23 Sep 2018 21:28 )  x     / <0.01 ng/mL / 46 U/L / x     / 3.2 ng/mL  CARDIAC MARKERS ( 23 Sep 2018 16:46 )  x     / 0.02 ng/mL / 45 U/L / x     / 3.4 ng/mL  CARDIAC MARKERS ( 23 Sep 2018 09:32 )  x     / 0.02 ng/mL / x     / x     / x        RADIOLOGY:  < from: [facility] Duplex Lower Ext Vein Scan, Bilat (09.23.18 @ 21:20) >  No evidence of deep venous thrombosis in the bilateral lower extremities.  < end of copied text >    < from: Xray Chest 1 View-PORTABLE IMMEDIATE (09.23.18 @ 12:13) >  Bilateral, left greater than right interstitial and airspace opacities, new since prior.  < end of copied text >    < from: CT Chest w/ IV Cont (09.23.18 @ 11:54) >  No central or lobar pulmonary embolism.    Bilateral multifocal patchy airspace opacities, compatible with   pneumonia. Small left pleural effusion.    Diffuse mediastinal lymphadenopathy, as well as conglomerate soft tissue density within the subcarinal region, measuring up to 4.5 x 3.4 cm (series 7 image 133). This causes mild mass effect upon the left mainstem bronchus, although it still remains patent. Additionally there is more significant mass effect upon the left atrium, just distal to the ostia of the left superior and inferior pulmonary veins.  Cannot exclude cardiac invasion.    1.4 cm right supraclavicular lymph node (series 8 image 35),     Bilateral upper lobe solid nodules measuring up to 1.1 cm.  < end of copied text >    PHYSICAL EXAM:  GEN: No acute distress w/ bipap attached  PULM: b/l wheezes  CARD: S1/S2 present. RRR.   GI: Soft, non-tender, non-distended. Bowel sounds present  MSK: NC/NC/NE/2+PP/ESPOSITO  NEURO: AAOX4 CHIEF COMPLAINT:  Patient is a 72y old Female who presents with a chief complaint of SOB, wheezing (24 Sep 2018 09:12)    Currently admitted to medicine with the primary diagnosis of Multifocal pneumonia     Today is hospital day 1d. This morning she is resting comfortably in bed and reports no new issues or overnight events.     PAST MEDICAL & SURGICAL HISTORY  Anxiety  Dyslipidemia  Pulmonary embolism  Lung cancer  H/O heart surgery: STENT PLACEMENT BYPASS AORTA CELIAC STENT  MI (myocardial infarction)  HTN (hypertension)  Aortocoronary bypass status  Altered bowel function: IN PAST HAD BOWEL SURGERY    SOCIAL HISTORY:  Negative for smoking/alcohol/drug use.     ALLERGIES:  No Known Allergies    MEDICATIONS:  STANDING MEDICATIONS  ALBUTerol/ipratropium for Nebulization 3 milliLiter(s) Nebulizer every 4 hours  buDESOnide 160 MICROgram(s)/formoterol 4.5 MICROgram(s) Inhaler 2 Puff(s) Inhalation two times a day  chlorhexidine 4% Liquid 1 Application(s) Topical <User Schedule>  enoxaparin Injectable 70 milliGRAM(s) SubCutaneous every 12 hours  lactated ringers. 1000 milliLiter(s) IV Continuous <Continuous>  LORazepam   Injectable 1 milliGRAM(s) IV Push three times a day  methylPREDNISolone sodium succinate Injectable 80 milliGRAM(s) IV Push every 8 hours  multivitamin 1 Tablet(s) Oral daily  pantoprazole  Injectable 40 milliGRAM(s) IV Push daily  vancomycin  IVPB 750 milliGRAM(s) IV Intermittent <User Schedule>  vancomycin  IVPB 500 milliGRAM(s) IV Intermittent <User Schedule>    PRN MEDICATIONS  ALBUTerol    90 MICROgram(s) HFA Inhaler 2 Puff(s) Inhalation every 4 hours PRN    VITALS:   T(F): 98.4  HR: 92  BP: 148/57  RR: 35  SpO2: 94%    LABS:             9.4    19.29 )-----------( 354      ( 24 Sep 2018 05:02 )             29.4     09-24    136  |  98  |  19  ----------------------------<  117<H>  4.7   |  24  |  0.9    Ca    10.1      24 Sep 2018 05:02  Mg     2.0     09-24    TPro  5.9<L>  /  Alb  2.6<L>  /  TBili  0.3  /  DBili  x   /  AST  35  /  ALT  18  /  AlkPhos  97  09-23    PT/INR - ( 23 Sep 2018 09:32 )   PT: 14.50 sec;   INR: 1.35 ratio    PTT - ( 23 Sep 2018 09:32 )  PTT:32.9 sec    Creatine Kinase, Serum: 46 U/L (09-23-18 @ 21:28)  Troponin T, Serum: <0.01 ng/mL (09-23-18 @ 21:28)  Creatine Kinase, Serum: 45 U/L (09-23-18 @ 16:46)  Troponin T, Serum: 0.02 ng/mL <H> (09-23-18 @ 16:46)    CARDIAC MARKERS ( 23 Sep 2018 21:28 )  x     / <0.01 ng/mL / 46 U/L / x     / 3.2 ng/mL  CARDIAC MARKERS ( 23 Sep 2018 16:46 )  x     / 0.02 ng/mL / 45 U/L / x     / 3.4 ng/mL  CARDIAC MARKERS ( 23 Sep 2018 09:32 )  x     / 0.02 ng/mL / x     / x     / x        RADIOLOGY:  < from: [facility] Duplex Lower Ext Vein Scan, Bilat (09.23.18 @ 21:20) >  No evidence of deep venous thrombosis in the bilateral lower extremities.  < end of copied text >    < from: Xray Chest 1 View-PORTABLE IMMEDIATE (09.23.18 @ 12:13) >  Bilateral, left greater than right interstitial and airspace opacities, new since prior.  < end of copied text >    < from: CT Chest w/ IV Cont (09.23.18 @ 11:54) >  No central or lobar pulmonary embolism.    Bilateral multifocal patchy airspace opacities, compatible with pneumonia. Small left pleural effusion.    Diffuse mediastinal lymphadenopathy, as well as conglomerate soft tissue density within the subcarinal region, measuring up to 4.5 x 3.4 cm (series 7 image 133). This causes mild mass effect upon the left mainstem bronchus, although it still remains patent. Additionally there is more significant mass effect upon the left atrium, just distal to the ostia of the left superior and inferior pulmonary veins.  Cannot exclude cardiac invasion.    1.4 cm right supraclavicular lymph node (series 8 image 35),     Bilateral upper lobe solid nodules measuring up to 1.1 cm.  < end of copied text >    < from: Transthoracic Echocardiogram (09.24.18 @ 10:30) >  1. Left ventricular ejection fraction, by visual estimation, is 65 to 70%.   2. Normal global left ventricular systolic function.   3. Spectral Doppler shows impaired relaxation pattern of left ventricular myocardial filling (Grade I diastolic dysfunction).   4. Trivial aortic regurgitation.   5. Mild-to-moderate mitral regurgitation.   6. Mild-to-moderate tricuspid regurgitation.   7. Estimated pulmonary artery systolic pressure is 111.9 mmHg assuming a right atrial pressure of 3 mmHg, which is consistent with severe pulmonary hypertension.  < end of copied text >    PHYSICAL EXAM:  GEN: No acute distress w/ bipap attached  PULM: b/l wheezes  CARD: S1/S2 present. RRR.   GI: Soft, non-tender, non-distended. Bowel sounds present  MSK: NC/NC/NE/2+PP/ESPOSITO  NEURO: AAOX4

## 2018-09-25 LAB
ALBUMIN SERPL ELPH-MCNC: 2.6 G/DL — LOW (ref 3.5–5.2)
ALP SERPL-CCNC: 77 U/L — SIGNIFICANT CHANGE UP (ref 30–115)
ALT FLD-CCNC: 11 U/L — SIGNIFICANT CHANGE UP (ref 0–41)
ANION GAP SERPL CALC-SCNC: 15 MMOL/L — HIGH (ref 7–14)
APPEARANCE UR: ABNORMAL
AST SERPL-CCNC: 10 U/L — SIGNIFICANT CHANGE UP (ref 0–41)
BACTERIA # UR AUTO: ABNORMAL /HPF
BASE EXCESS BLDA CALC-SCNC: -1.2 MMOL/L — SIGNIFICANT CHANGE UP (ref -2–2)
BASOPHILS # BLD AUTO: 0.01 K/UL — SIGNIFICANT CHANGE UP (ref 0–0.2)
BASOPHILS NFR BLD AUTO: 0.1 % — SIGNIFICANT CHANGE UP (ref 0–1)
BILIRUB SERPL-MCNC: <0.2 MG/DL — SIGNIFICANT CHANGE UP (ref 0.2–1.2)
BILIRUB UR-MCNC: ABNORMAL
BUN SERPL-MCNC: 23 MG/DL — HIGH (ref 10–20)
CALCIUM SERPL-MCNC: 9.6 MG/DL — SIGNIFICANT CHANGE UP (ref 8.5–10.1)
CHLORIDE SERPL-SCNC: 104 MMOL/L — SIGNIFICANT CHANGE UP (ref 98–110)
CO2 SERPL-SCNC: 23 MMOL/L — SIGNIFICANT CHANGE UP (ref 17–32)
COLOR SPEC: ABNORMAL
CREAT SERPL-MCNC: 0.8 MG/DL — SIGNIFICANT CHANGE UP (ref 0.7–1.5)
DIFF PNL FLD: ABNORMAL
EOSINOPHIL # BLD AUTO: 0 K/UL — SIGNIFICANT CHANGE UP (ref 0–0.7)
EOSINOPHIL NFR BLD AUTO: 0 % — SIGNIFICANT CHANGE UP (ref 0–8)
EPI CELLS # UR: ABNORMAL /HPF
GLUCOSE BLDC GLUCOMTR-MCNC: 160 MG/DL — HIGH (ref 70–99)
GLUCOSE BLDC GLUCOMTR-MCNC: 198 MG/DL — HIGH (ref 70–99)
GLUCOSE BLDC GLUCOMTR-MCNC: 204 MG/DL — HIGH (ref 70–99)
GLUCOSE SERPL-MCNC: 164 MG/DL — HIGH (ref 70–99)
GLUCOSE UR QL: 100 MG/DL
HCO3 BLDA-SCNC: 25 MMOL/L — SIGNIFICANT CHANGE UP (ref 23–27)
HCT VFR BLD CALC: 27.9 % — LOW (ref 37–47)
HGB BLD-MCNC: 9 G/DL — LOW (ref 12–16)
IMM GRANULOCYTES NFR BLD AUTO: 0.9 % — HIGH (ref 0.1–0.3)
KETONES UR-MCNC: ABNORMAL
LEUKOCYTE ESTERASE UR-ACNC: ABNORMAL
LYMPHOCYTES # BLD AUTO: 0.43 K/UL — LOW (ref 1.2–3.4)
LYMPHOCYTES # BLD AUTO: 2.6 % — LOW (ref 20.5–51.1)
MCHC RBC-ENTMCNC: 26.8 PG — LOW (ref 27–31)
MCHC RBC-ENTMCNC: 32.3 G/DL — SIGNIFICANT CHANGE UP (ref 32–37)
MCV RBC AUTO: 83 FL — SIGNIFICANT CHANGE UP (ref 81–99)
MONOCYTES # BLD AUTO: 0.1 K/UL — SIGNIFICANT CHANGE UP (ref 0.1–0.6)
MONOCYTES NFR BLD AUTO: 0.6 % — LOW (ref 1.7–9.3)
NEUTROPHILS # BLD AUTO: 15.63 K/UL — HIGH (ref 1.4–6.5)
NEUTROPHILS NFR BLD AUTO: 95.8 % — HIGH (ref 42.2–75.2)
NITRITE UR-MCNC: NEGATIVE — SIGNIFICANT CHANGE UP
NRBC # BLD: 0 /100 WBCS — SIGNIFICANT CHANGE UP (ref 0–0)
PCO2 BLDA: 45 MMHG — HIGH (ref 38–42)
PH BLDA: 7.35 — LOW (ref 7.38–7.42)
PH UR: 6 — SIGNIFICANT CHANGE UP (ref 5–8)
PLATELET # BLD AUTO: 331 K/UL — SIGNIFICANT CHANGE UP (ref 130–400)
PO2 BLDA: 65 MMHG — LOW (ref 78–95)
POTASSIUM SERPL-MCNC: 3.8 MMOL/L — SIGNIFICANT CHANGE UP (ref 3.5–5)
POTASSIUM SERPL-SCNC: 3.8 MMOL/L — SIGNIFICANT CHANGE UP (ref 3.5–5)
PROT SERPL-MCNC: 4.9 G/DL — LOW (ref 6–8)
PROT UR-MCNC: 100
RBC # BLD: 3.36 M/UL — LOW (ref 4.2–5.4)
RBC # FLD: 19.5 % — HIGH (ref 11.5–14.5)
RBC CASTS # UR COMP ASSIST: >50 /HPF
SAO2 % BLDA: 90 % — LOW (ref 94–98)
SODIUM SERPL-SCNC: 142 MMOL/L — SIGNIFICANT CHANGE UP (ref 135–146)
SP GR SPEC: >=1.03 — SIGNIFICANT CHANGE UP (ref 1.01–1.03)
UROBILINOGEN FLD QL: 0.2 — SIGNIFICANT CHANGE UP (ref 0.2–0.2)
WBC # BLD: 16.31 K/UL — HIGH (ref 4.8–10.8)
WBC # FLD AUTO: 16.31 K/UL — HIGH (ref 4.8–10.8)
WBC UR QL: SIGNIFICANT CHANGE UP /HPF

## 2018-09-25 RX ORDER — FENTANYL CITRATE 50 UG/ML
2 INJECTION INTRAVENOUS
Qty: 5000 | Refills: 0 | Status: DISCONTINUED | OUTPATIENT
Start: 2018-09-25 | End: 2018-09-25

## 2018-09-25 RX ORDER — PROPOFOL 10 MG/ML
25 INJECTION, EMULSION INTRAVENOUS
Qty: 1000 | Refills: 0 | Status: DISCONTINUED | OUTPATIENT
Start: 2018-09-25 | End: 2018-09-25

## 2018-09-25 RX ORDER — FENTANYL CITRATE 50 UG/ML
0.5 INJECTION INTRAVENOUS
Qty: 2500 | Refills: 0 | Status: DISCONTINUED | OUTPATIENT
Start: 2018-09-25 | End: 2018-09-28

## 2018-09-25 RX ORDER — CEFEPIME 1 G/1
INJECTION, POWDER, FOR SOLUTION INTRAMUSCULAR; INTRAVENOUS
Qty: 0 | Refills: 0 | Status: DISCONTINUED | OUTPATIENT
Start: 2018-09-25 | End: 2018-09-25

## 2018-09-25 RX ORDER — METOPROLOL TARTRATE 50 MG
25 TABLET ORAL ONCE
Qty: 0 | Refills: 0 | Status: DISCONTINUED | OUTPATIENT
Start: 2018-09-25 | End: 2018-09-25

## 2018-09-25 RX ORDER — CEFEPIME 1 G/1
2000 INJECTION, POWDER, FOR SOLUTION INTRAMUSCULAR; INTRAVENOUS ONCE
Qty: 0 | Refills: 0 | Status: COMPLETED | OUTPATIENT
Start: 2018-09-25 | End: 2018-09-25

## 2018-09-25 RX ORDER — CEFEPIME 1 G/1
2000 INJECTION, POWDER, FOR SOLUTION INTRAMUSCULAR; INTRAVENOUS EVERY 8 HOURS
Qty: 0 | Refills: 0 | Status: DISCONTINUED | OUTPATIENT
Start: 2018-09-25 | End: 2018-09-28

## 2018-09-25 RX ORDER — PROPOFOL 10 MG/ML
15 INJECTION, EMULSION INTRAVENOUS
Qty: 1000 | Refills: 0 | Status: DISCONTINUED | OUTPATIENT
Start: 2018-09-25 | End: 2018-09-26

## 2018-09-25 RX ORDER — CEFEPIME 1 G/1
INJECTION, POWDER, FOR SOLUTION INTRAMUSCULAR; INTRAVENOUS
Qty: 0 | Refills: 0 | Status: DISCONTINUED | OUTPATIENT
Start: 2018-09-25 | End: 2018-09-28

## 2018-09-25 RX ORDER — METOPROLOL TARTRATE 50 MG
5 TABLET ORAL ONCE
Qty: 0 | Refills: 0 | Status: COMPLETED | OUTPATIENT
Start: 2018-09-25 | End: 2018-09-25

## 2018-09-25 RX ADMIN — Medication 80 MILLIGRAM(S): at 14:14

## 2018-09-25 RX ADMIN — CEFEPIME 100 MILLIGRAM(S): 1 INJECTION, POWDER, FOR SOLUTION INTRAMUSCULAR; INTRAVENOUS at 22:17

## 2018-09-25 RX ADMIN — CEFEPIME 100 MILLIGRAM(S): 1 INJECTION, POWDER, FOR SOLUTION INTRAMUSCULAR; INTRAVENOUS at 14:14

## 2018-09-25 RX ADMIN — SODIUM CHLORIDE 75 MILLILITER(S): 9 INJECTION, SOLUTION INTRAVENOUS at 09:09

## 2018-09-25 RX ADMIN — Medication 80 MILLIGRAM(S): at 05:16

## 2018-09-25 RX ADMIN — Medication 100 MILLIGRAM(S): at 17:05

## 2018-09-25 RX ADMIN — ENOXAPARIN SODIUM 70 MILLIGRAM(S): 100 INJECTION SUBCUTANEOUS at 05:18

## 2018-09-25 RX ADMIN — CEFEPIME 100 MILLIGRAM(S): 1 INJECTION, POWDER, FOR SOLUTION INTRAMUSCULAR; INTRAVENOUS at 09:32

## 2018-09-25 RX ADMIN — Medication 5 MILLIGRAM(S): at 07:10

## 2018-09-25 RX ADMIN — Medication 250 MILLIGRAM(S): at 05:17

## 2018-09-25 RX ADMIN — Medication 1 MILLIGRAM(S): at 07:00

## 2018-09-25 RX ADMIN — PANTOPRAZOLE SODIUM 40 MILLIGRAM(S): 20 TABLET, DELAYED RELEASE ORAL at 13:24

## 2018-09-25 RX ADMIN — SODIUM CHLORIDE 75 MILLILITER(S): 9 INJECTION, SOLUTION INTRAVENOUS at 05:20

## 2018-09-25 RX ADMIN — PROPOFOL 5.9 MICROGRAM(S)/KG/MIN: 10 INJECTION, EMULSION INTRAVENOUS at 14:15

## 2018-09-25 RX ADMIN — ENOXAPARIN SODIUM 70 MILLIGRAM(S): 100 INJECTION SUBCUTANEOUS at 17:05

## 2018-09-25 RX ADMIN — Medication 3 MILLILITER(S): at 08:51

## 2018-09-25 RX ADMIN — Medication 1 TABLET(S): at 13:25

## 2018-09-25 RX ADMIN — Medication 80 MILLIGRAM(S): at 22:16

## 2018-09-25 NOTE — PROGRESS NOTE ADULT - SUBJECTIVE AND OBJECTIVE BOX
CHIEF COMPLAINT:  Patient is a 72y old Female who presents with a chief complaint of SOB, wheezing (25 Sep 2018 08:53)    Currently admitted to medicine with the primary diagnosis of Multifocal pneumonia     Today is hospital day 2d. This morning she is resting comfortably in bed and reports no new issues or overnight events.     PAST MEDICAL & SURGICAL HISTORY  Anxiety  Dyslipidemia  Pulmonary embolism  Lung cancer  H/O heart surgery: STENT PLACEMENT BYPASS AORTA CELIAC STENT  MI (myocardial infarction)  HTN (hypertension)  Aortocoronary bypass status  Altered bowel function: IN PAST HAD BOWEL SURGERY    SOCIAL HISTORY:  Negative for smoking/alcohol/drug use.     ALLERGIES:  No Known Allergies    MEDICATIONS:  STANDING MEDICATIONS  ALBUTerol/ipratropium for Nebulization 3 milliLiter(s) Nebulizer every 4 hours  buDESOnide 160 MICROgram(s)/formoterol 4.5 MICROgram(s) Inhaler 2 Puff(s) Inhalation two times a day  cefepime   IVPB      cefepime   IVPB 2000 milliGRAM(s) IV Intermittent every 8 hours  chlorhexidine 4% Liquid 1 Application(s) Topical <User Schedule>  enoxaparin Injectable 70 milliGRAM(s) SubCutaneous every 12 hours  lactated ringers. 1000 milliLiter(s) IV Continuous <Continuous>  levoFLOXacin IVPB      LORazepam   Injectable 1 milliGRAM(s) IV Push three times a day  methylPREDNISolone sodium succinate Injectable 80 milliGRAM(s) IV Push every 8 hours  multivitamin 1 Tablet(s) Oral daily  pantoprazole  Injectable 40 milliGRAM(s) IV Push daily  propofol Infusion 25 MICROgram(s)/kG/Min IV Continuous <Continuous>  vancomycin  IVPB 750 milliGRAM(s) IV Intermittent <User Schedule>  vancomycin  IVPB 500 milliGRAM(s) IV Intermittent <User Schedule>    PRN MEDICATIONS  ALBUTerol    90 MICROgram(s) HFA Inhaler 2 Puff(s) Inhalation every 4 hours PRN    VITALS:   T(F): 96.3  HR: 107  BP: 142/68  RR: 25  SpO2: 100%    LABS:             9.0    16.31 )-----------( 331      ( 25 Sep 2018 04:20 )             27.9     09-25    142  |  104  |  23<H>  ----------------------------<  164<H>  3.8   |  23  |  0.8    Ca    9.6      25 Sep 2018 04:20  Mg     2.0     09-24    TPro  4.9<L>  /  Alb  2.6<L>  /  TBili  <0.2  /  DBili  x   /  AST  10  /  ALT  11  /  AlkPhos  77  09-25    ABG - ( 25 Sep 2018 11:59 )  pH, Arterial: 7.37  pH, Blood: x     /  pCO2: 46    /  pO2: 173   / HCO3: 26    / Base Excess: 0.7   /  SaO2: 100       Culture - Blood (collected 23 Sep 2018 16:46)  Source: .Blood None  Preliminary Report (24 Sep 2018 22:01):    No growth to date.    Culture - Blood (collected 23 Sep 2018 09:33)  Source: .Blood Blood  Preliminary Report (24 Sep 2018 18:01):    No growth to date.    Culture - Blood (collected 23 Sep 2018 09:33)  Source: .Blood Blood  Preliminary Report (24 Sep 2018 18:01):    No growth to date.    CARDIAC MARKERS ( 23 Sep 2018 21:28 )  x     / <0.01 ng/mL / 46 U/L / x     / 3.2 ng/mL  CARDIAC MARKERS ( 23 Sep 2018 16:46 )  x     / 0.02 ng/mL / 45 U/L / x     / 3.4 ng/mL    RADIOLOGY:  < from: Xray Chest 1 View-PORTABLE IMMEDIATE (09.25.18 @ 10:37) >  Support devices: Endotracheal tube tip above the huyen. NG tube tip just   distal to the EG junction.. Telemetry leads.    Impression:    Bilateral opacities and left pleural effusion, unchanged. Support devices as described.  < end of copied text >    PHYSICAL EXAM:  GEN: intubated  PULM: b/l wheezes  CARD: S1/S2 present. RRR.   GI: Soft, non-distended. Bowel sounds present  NEURO: sedated

## 2018-09-25 NOTE — PROGRESS NOTE ADULT - ASSESSMENT
73 y/o lady with PMH HTN, stage 4 lung CA, b/l PE, hx aortofemoral bypass, hx celiac artery stent, hx SMA stent COPD on home oxygen (6 L), CAD s/p RCA stent, hx ischemic bowel surgery who presents with cough, wheezing, and acute shortness of breath. Patient had her first chemo treatment last week. As per , she has been coughing up blood at times. Patient presents in acute respiratory distress and was hypoxic to 80's on RA. Patient placed on immediate BIPAP after being rushed into Critical Care ED. Appropriate labs sent, EKG, CXR.  Patient had CT scan of chest to evaluate lung and for PE. Respiratory status improved with BIPAP.  Patient found to have multi-focal pneumonia and treated for HCAP. No PE on CT scan of chest.    ICU admission for sepsis, multifocal pneumonia, and respiratory distress.    9/25: intubated    Acute hypoxemic respiratory failure  - did poorly on BiPAP, HFNC, persistently respiring >30  - now intubated, on fentanyl for sedation    HCAP vs chemo related pneumonitis. h/o Stage IV squamous cell carcinoma  - CT w/ IVC 9/23 w/ bilateral multifocal patchy airspace opacities consistent with pneumonia  - CXR 9/23 Bilateral, left greater than right interstitial and airspace opacities, new since prior.  - MRSA swab negative  - f/u tracheal culture  - c/w vancomycin, levaquin    COPD exacerbation  - on home oxygen (6L)  - s/w nebs q4h  - s/w solumedrol 80mg q8h    GI PPx: protonix IV qd  DVT PPx: lovenox 70 bid 71 y/o lady with PMH HTN, stage 4 lung CA, b/l PE, hx aortofemoral bypass, hx celiac artery stent, hx SMA stent COPD on home oxygen (6 L), CAD s/p RCA stent, hx ischemic bowel surgery who presents with cough, wheezing, and acute shortness of breath. Patient had her first chemo treatment last week. As per , she has been coughing up blood at times. Patient presents in acute respiratory distress and was hypoxic to 80's on RA. Patient placed on immediate BIPAP after being rushed into Critical Care ED. Appropriate labs sent, EKG, CXR.  Patient had CT scan of chest to evaluate lung and for PE. Respiratory status improved with BIPAP.  Patient found to have multi-focal pneumonia and treated for HCAP. No PE on CT scan of chest.    ICU admission for sepsis, multifocal pneumonia, and respiratory distress.    9/25: intubated    Acute hypoxemic respiratory failure  - did poorly on BiPAP, HFNC, persistently respiring >30  - now intubated, on fentanyl and propofol for sedation    HCAP vs chemo related pneumonitis. h/o Stage IV squamous cell carcinoma  - CT w/ IVC 9/23 w/ bilateral multifocal patchy airspace opacities consistent with pneumonia  - CXR 9/23 Bilateral, left greater than right interstitial and airspace opacities, new since prior.  - MRSA swab negative  - f/u tracheal culture  - c/w vancomycin, levaquin    COPD exacerbation  - on home oxygen (6L)  - s/w nebs q4h  - s/w solumedrol 80mg q8h    Anxiety  - holding home ativan while on propofol sedation    GI PPx: protonix IV qd  DVT PPx: lovenox 70 bid

## 2018-09-25 NOTE — PROGRESS NOTE ADULT - SUBJECTIVE AND OBJECTIVE BOX
Patient is a 72y old  Female who presents with a chief complaint of SOB, wheezing (24 Sep 2018 09:12)        Over Night Events: Worsening respiratory status.  More SOB and use of accessory muscles.          ROS:  See HPI    PHYSICAL EXAM    ICU Vital Signs Last 24 Hrs  T(C): 35.7 (25 Sep 2018 04:00), Max: 36.9 (24 Sep 2018 12:00)  T(F): 96.3 (25 Sep 2018 04:00), Max: 98.4 (24 Sep 2018 12:00)  HR: 152 (25 Sep 2018 07:00) (80 - 152)  BP: 117/76 (25 Sep 2018 07:00) (82/52 - 154/63)  BP(mean): 95 (25 Sep 2018 07:00) (64 - 108)  ABP: --  ABP(mean): --  RR: 41 (25 Sep 2018 07:00) (25 - 46)  SpO2: 83% (25 Sep 2018 07:00) (83% - 98%)      General: Awake.  Follows simple commands.  Moderate distress  HEENT: HAFSA             Lymphatic system: No cervical LN   Lungs: Bilateral BS.  [id number] crackles.  Faint end expiratory wheezing   Cardiovascular: Regular   Gastrointestinal: Soft, Positive BS  Extremities: No clubbing.  Moves extremities.  Full Range of motion   Skin: Warm, intact  Neurological: No motor or sensory deficit       09-24-18 @ 07:01  -  09-25-18 @ 07:00  --------------------------------------------------------  IN:    IV PiggyBack: 600 mL    lactated ringers.: 1650 mL  Total IN: 2250 mL    OUT:    Indwelling Catheter - Urethral: 430 mL  Total OUT: 430 mL    Total NET: 1820 mL          LABS:                            9.0    16.31 )-----------( 331      ( 25 Sep 2018 04:20 )             27.9                                               09-25    142  |  104  |  23<H>  ----------------------------<  164<H>  3.8   |  23  |  0.8    Ca    9.6      25 Sep 2018 04:20  Mg     2.0     09-24    TPro  4.9<L>  /  Alb  2.6<L>  /  TBili  <0.2  /  DBili  x   /  AST  10  /  ALT  11  /  AlkPhos  77  09-25      PT/INR - ( 23 Sep 2018 09:32 )   PT: 14.50 sec;   INR: 1.35 ratio         PTT - ( 23 Sep 2018 09:32 )  PTT:32.9 sec                                           CARDIAC MARKERS ( 23 Sep 2018 21:28 )  x     / <0.01 ng/mL / 46 U/L / x     / 3.2 ng/mL  CARDIAC MARKERS ( 23 Sep 2018 16:46 )  x     / 0.02 ng/mL / 45 U/L / x     / 3.4 ng/mL  CARDIAC MARKERS ( 23 Sep 2018 09:32 )  x     / 0.02 ng/mL / x     / x     / x                                                LIVER FUNCTIONS - ( 25 Sep 2018 04:20 )  Alb: 2.6 g/dL / Pro: 4.9 g/dL / ALK PHOS: 77 U/L / ALT: 11 U/L / AST: 10 U/L / GGT: x                                                  Culture - Blood (collected 23 Sep 2018 16:46)  Source: .Blood None  Preliminary Report (24 Sep 2018 22:01):    No growth to date.    Culture - Blood (collected 23 Sep 2018 09:33)  Source: .Blood Blood  Preliminary Report (24 Sep 2018 18:01):    No growth to date.    Culture - Blood (collected 23 Sep 2018 09:33)  Source: .Blood Blood  Preliminary Report (24 Sep 2018 18:01):    No growth to date.                                                                                       ABG - ( 25 Sep 2018 08:14 )  pH, Arterial: 7.35  pH, Blood: x     /  pCO2: 45    /  pO2: 65    / HCO3: 25    / Base Excess: -1.2  /  SaO2: 90                  MEDICATIONS  (STANDING):  ALBUTerol/ipratropium for Nebulization 3 milliLiter(s) Nebulizer every 4 hours  buDESOnide 160 MICROgram(s)/formoterol 4.5 MICROgram(s) Inhaler 2 Puff(s) Inhalation two times a day  chlorhexidine 4% Liquid 1 Application(s) Topical <User Schedule>  enoxaparin Injectable 70 milliGRAM(s) SubCutaneous every 12 hours  lactated ringers. 1000 milliLiter(s) (75 mL/Hr) IV Continuous <Continuous>  LORazepam   Injectable 1 milliGRAM(s) IV Push three times a day  methylPREDNISolone sodium succinate Injectable 80 milliGRAM(s) IV Push every 8 hours  multivitamin 1 Tablet(s) Oral daily  pantoprazole  Injectable 40 milliGRAM(s) IV Push daily  vancomycin  IVPB 750 milliGRAM(s) IV Intermittent <User Schedule>  vancomycin  IVPB 500 milliGRAM(s) IV Intermittent <User Schedule>    MEDICATIONS  (PRN):  ALBUTerol    90 MICROgram(s) HFA Inhaler 2 Puff(s) Inhalation every 4 hours PRN Shortness of Breath      Xrays:            Improving bilateral infiltrates                                                                          ECHO

## 2018-09-25 NOTE — PROGRESS NOTE ADULT - ASSESSMENT
IMPRESSION:    Acute hypoxemic respiratory failure.  Failed NIV  HCAP VS pneumonitis  HO Stage IV squamous cell carcinoma  COPD exacerbation  HO PE    PLAN:    CNS: Sedation after intubation and MV.      HEENT: Oral care    PULMONARY:  HOB @ 45 degrees.  Nebs Q 4.  Solumedrol 80 mg Q8.  Vent 400 18 100 7.5.  ABG and adjust vent settings    CARDIOVASCULAR: IVF with LR 75 cc per hour for now.  DC IVF once tolerating OG feeding     GI: GI prophylaxis.  OG feeding after intubation     RENAL:  Follow up lytes.  Correct as needed    INFECTIOUS DISEASE: Follow up cultures.  FU Vancomycin trough       HEMATOLOGICAL:  DVT TX    ENDOCRINE:  Follow up FS.  Insulin protocol if needed.    MUSCULOSKELETAL:    GOC DW  and patient     Keep Donato for now    AI positive

## 2018-09-26 LAB
ANION GAP SERPL CALC-SCNC: 13 MMOL/L — SIGNIFICANT CHANGE UP (ref 7–14)
BASE EXCESS BLDA CALC-SCNC: 2 MMOL/L — SIGNIFICANT CHANGE UP (ref -2–2)
BASOPHILS # BLD AUTO: 0.01 K/UL — SIGNIFICANT CHANGE UP (ref 0–0.2)
BASOPHILS NFR BLD AUTO: 0.1 % — SIGNIFICANT CHANGE UP (ref 0–1)
BUN SERPL-MCNC: 33 MG/DL — HIGH (ref 10–20)
CALCIUM SERPL-MCNC: 9.3 MG/DL — SIGNIFICANT CHANGE UP (ref 8.5–10.1)
CHLORIDE SERPL-SCNC: 100 MMOL/L — SIGNIFICANT CHANGE UP (ref 98–110)
CO2 SERPL-SCNC: 24 MMOL/L — SIGNIFICANT CHANGE UP (ref 17–32)
CREAT SERPL-MCNC: 0.8 MG/DL — SIGNIFICANT CHANGE UP (ref 0.7–1.5)
EOSINOPHIL # BLD AUTO: 0 K/UL — SIGNIFICANT CHANGE UP (ref 0–0.7)
EOSINOPHIL NFR BLD AUTO: 0 % — SIGNIFICANT CHANGE UP (ref 0–8)
GLUCOSE BLDC GLUCOMTR-MCNC: 189 MG/DL — HIGH (ref 70–99)
GLUCOSE SERPL-MCNC: 230 MG/DL — HIGH (ref 70–99)
HCO3 BLDA-SCNC: 27 MMOL/L — SIGNIFICANT CHANGE UP (ref 23–27)
HCT VFR BLD CALC: 28.4 % — LOW (ref 37–47)
HGB BLD-MCNC: 8.6 G/DL — LOW (ref 12–16)
IMM GRANULOCYTES NFR BLD AUTO: 0.9 % — HIGH (ref 0.1–0.3)
LYMPHOCYTES # BLD AUTO: 0.51 K/UL — LOW (ref 1.2–3.4)
LYMPHOCYTES # BLD AUTO: 3 % — LOW (ref 20.5–51.1)
MAGNESIUM SERPL-MCNC: 2 MG/DL — SIGNIFICANT CHANGE UP (ref 1.8–2.4)
MCHC RBC-ENTMCNC: 26.3 PG — LOW (ref 27–31)
MCHC RBC-ENTMCNC: 30.3 G/DL — LOW (ref 32–37)
MCV RBC AUTO: 86.9 FL — SIGNIFICANT CHANGE UP (ref 81–99)
MONOCYTES # BLD AUTO: 0.46 K/UL — SIGNIFICANT CHANGE UP (ref 0.1–0.6)
MONOCYTES NFR BLD AUTO: 2.7 % — SIGNIFICANT CHANGE UP (ref 1.7–9.3)
NEUTROPHILS # BLD AUTO: 16.14 K/UL — HIGH (ref 1.4–6.5)
NEUTROPHILS NFR BLD AUTO: 93.3 % — HIGH (ref 42.2–75.2)
NRBC # BLD: 0 /100 WBCS — SIGNIFICANT CHANGE UP (ref 0–0)
PCO2 BLDA: 46 MMHG — HIGH (ref 38–42)
PH BLDA: 7.38 — SIGNIFICANT CHANGE UP (ref 7.38–7.42)
PLATELET # BLD AUTO: 307 K/UL — SIGNIFICANT CHANGE UP (ref 130–400)
PO2 BLDA: 104 MMHG — HIGH (ref 78–95)
POTASSIUM SERPL-MCNC: 4.8 MMOL/L — SIGNIFICANT CHANGE UP (ref 3.5–5)
POTASSIUM SERPL-SCNC: 4.8 MMOL/L — SIGNIFICANT CHANGE UP (ref 3.5–5)
RBC # BLD: 3.27 M/UL — LOW (ref 4.2–5.4)
RBC # FLD: 19.6 % — HIGH (ref 11.5–14.5)
SAO2 % BLDA: 99 % — HIGH (ref 94–98)
SODIUM SERPL-SCNC: 137 MMOL/L — SIGNIFICANT CHANGE UP (ref 135–146)
TROPONIN T SERPL-MCNC: <0.01 NG/ML — SIGNIFICANT CHANGE UP
TROPONIN T SERPL-MCNC: <0.01 NG/ML — SIGNIFICANT CHANGE UP
WBC # BLD: 17.28 K/UL — HIGH (ref 4.8–10.8)
WBC # FLD AUTO: 17.28 K/UL — HIGH (ref 4.8–10.8)

## 2018-09-26 RX ORDER — FUROSEMIDE 40 MG
40 TABLET ORAL ONCE
Qty: 0 | Refills: 0 | Status: COMPLETED | OUTPATIENT
Start: 2018-09-26 | End: 2018-09-26

## 2018-09-26 RX ORDER — METOPROLOL TARTRATE 50 MG
25 TABLET ORAL DAILY
Qty: 0 | Refills: 0 | Status: DISCONTINUED | OUTPATIENT
Start: 2018-09-26 | End: 2018-09-26

## 2018-09-26 RX ORDER — AMIODARONE HYDROCHLORIDE 400 MG/1
0.5 TABLET ORAL
Qty: 900 | Refills: 0 | Status: DISCONTINUED | OUTPATIENT
Start: 2018-09-26 | End: 2018-09-28

## 2018-09-26 RX ORDER — FENTANYL CITRATE 50 UG/ML
50 INJECTION INTRAVENOUS ONCE
Qty: 0 | Refills: 0 | Status: DISCONTINUED | OUTPATIENT
Start: 2018-09-26 | End: 2018-09-26

## 2018-09-26 RX ORDER — AMIODARONE HYDROCHLORIDE 400 MG/1
1 TABLET ORAL
Qty: 900 | Refills: 0 | Status: DISCONTINUED | OUTPATIENT
Start: 2018-09-26 | End: 2018-09-26

## 2018-09-26 RX ORDER — ALBUTEROL 90 UG/1
2 AEROSOL, METERED ORAL EVERY 4 HOURS
Qty: 0 | Refills: 0 | Status: DISCONTINUED | OUTPATIENT
Start: 2018-09-26 | End: 2018-09-26

## 2018-09-26 RX ORDER — METOPROLOL TARTRATE 50 MG
12.5 TABLET ORAL DAILY
Qty: 0 | Refills: 0 | Status: DISCONTINUED | OUTPATIENT
Start: 2018-09-26 | End: 2018-09-26

## 2018-09-26 RX ORDER — METOPROLOL TARTRATE 50 MG
12.5 TABLET ORAL
Qty: 0 | Refills: 0 | Status: DISCONTINUED | OUTPATIENT
Start: 2018-09-26 | End: 2018-09-28

## 2018-09-26 RX ORDER — CHLORHEXIDINE GLUCONATE 213 G/1000ML
15 SOLUTION TOPICAL
Qty: 0 | Refills: 0 | Status: DISCONTINUED | OUTPATIENT
Start: 2018-09-26 | End: 2018-09-28

## 2018-09-26 RX ORDER — AMIODARONE HYDROCHLORIDE 400 MG/1
150 TABLET ORAL ONCE
Qty: 0 | Refills: 0 | Status: COMPLETED | OUTPATIENT
Start: 2018-09-26 | End: 2018-09-26

## 2018-09-26 RX ORDER — MIDAZOLAM HYDROCHLORIDE 1 MG/ML
1 INJECTION, SOLUTION INTRAMUSCULAR; INTRAVENOUS ONCE
Qty: 0 | Refills: 0 | Status: DISCONTINUED | OUTPATIENT
Start: 2018-09-26 | End: 2018-09-26

## 2018-09-26 RX ORDER — ALBUTEROL 90 UG/1
1 AEROSOL, METERED ORAL EVERY 4 HOURS
Qty: 0 | Refills: 0 | Status: DISCONTINUED | OUTPATIENT
Start: 2018-09-26 | End: 2018-09-28

## 2018-09-26 RX ORDER — MIDAZOLAM HYDROCHLORIDE 1 MG/ML
0.02 INJECTION, SOLUTION INTRAMUSCULAR; INTRAVENOUS
Qty: 100 | Refills: 0 | Status: DISCONTINUED | OUTPATIENT
Start: 2018-09-26 | End: 2018-09-28

## 2018-09-26 RX ORDER — MIDAZOLAM HYDROCHLORIDE 1 MG/ML
3 INJECTION, SOLUTION INTRAMUSCULAR; INTRAVENOUS ONCE
Qty: 0 | Refills: 0 | Status: DISCONTINUED | OUTPATIENT
Start: 2018-09-26 | End: 2018-09-26

## 2018-09-26 RX ORDER — SODIUM CHLORIDE 9 MG/ML
500 INJECTION, SOLUTION INTRAVENOUS ONCE
Qty: 0 | Refills: 0 | Status: DISCONTINUED | OUTPATIENT
Start: 2018-09-26 | End: 2018-09-26

## 2018-09-26 RX ADMIN — CEFEPIME 100 MILLIGRAM(S): 1 INJECTION, POWDER, FOR SOLUTION INTRAMUSCULAR; INTRAVENOUS at 05:39

## 2018-09-26 RX ADMIN — AMIODARONE HYDROCHLORIDE 33.33 MG/MIN: 400 TABLET ORAL at 09:00

## 2018-09-26 RX ADMIN — ENOXAPARIN SODIUM 70 MILLIGRAM(S): 100 INJECTION SUBCUTANEOUS at 18:26

## 2018-09-26 RX ADMIN — Medication 80 MILLIGRAM(S): at 21:03

## 2018-09-26 RX ADMIN — FENTANYL CITRATE 50 MICROGRAM(S): 50 INJECTION INTRAVENOUS at 10:45

## 2018-09-26 RX ADMIN — CHLORHEXIDINE GLUCONATE 15 MILLILITER(S): 213 SOLUTION TOPICAL at 18:26

## 2018-09-26 RX ADMIN — Medication 1 TABLET(S): at 12:24

## 2018-09-26 RX ADMIN — Medication 80 MILLIGRAM(S): at 13:57

## 2018-09-26 RX ADMIN — Medication 40 MILLIGRAM(S): at 10:17

## 2018-09-26 RX ADMIN — CEFEPIME 100 MILLIGRAM(S): 1 INJECTION, POWDER, FOR SOLUTION INTRAMUSCULAR; INTRAVENOUS at 13:56

## 2018-09-26 RX ADMIN — MIDAZOLAM HYDROCHLORIDE 1 MILLIGRAM(S): 1 INJECTION, SOLUTION INTRAMUSCULAR; INTRAVENOUS at 10:15

## 2018-09-26 RX ADMIN — Medication 100 MILLIGRAM(S): at 18:26

## 2018-09-26 RX ADMIN — Medication 80 MILLIGRAM(S): at 05:37

## 2018-09-26 RX ADMIN — Medication 250 MILLIGRAM(S): at 06:29

## 2018-09-26 RX ADMIN — MIDAZOLAM HYDROCHLORIDE 1.31 MG/KG/HR: 1 INJECTION, SOLUTION INTRAMUSCULAR; INTRAVENOUS at 10:00

## 2018-09-26 RX ADMIN — PANTOPRAZOLE SODIUM 40 MILLIGRAM(S): 20 TABLET, DELAYED RELEASE ORAL at 12:24

## 2018-09-26 RX ADMIN — Medication 12.5 MILLIGRAM(S): at 18:27

## 2018-09-26 RX ADMIN — ENOXAPARIN SODIUM 70 MILLIGRAM(S): 100 INJECTION SUBCUTANEOUS at 05:37

## 2018-09-26 RX ADMIN — CHLORHEXIDINE GLUCONATE 1 APPLICATION(S): 213 SOLUTION TOPICAL at 05:37

## 2018-09-26 RX ADMIN — FENTANYL CITRATE 50 MICROGRAM(S): 50 INJECTION INTRAVENOUS at 10:15

## 2018-09-26 RX ADMIN — AMIODARONE HYDROCHLORIDE 618 MILLIGRAM(S): 400 TABLET ORAL at 08:57

## 2018-09-26 RX ADMIN — MIDAZOLAM HYDROCHLORIDE 3 MILLIGRAM(S): 1 INJECTION, SOLUTION INTRAMUSCULAR; INTRAVENOUS at 10:50

## 2018-09-26 RX ADMIN — CEFEPIME 100 MILLIGRAM(S): 1 INJECTION, POWDER, FOR SOLUTION INTRAMUSCULAR; INTRAVENOUS at 21:03

## 2018-09-26 RX ADMIN — FENTANYL CITRATE 3.28 MICROGRAM(S)/KG/HR: 50 INJECTION INTRAVENOUS at 12:00

## 2018-09-26 NOTE — PROGRESS NOTE ADULT - ASSESSMENT
71 y/o lady with PMH HTN, stage 4 lung CA, h/o b/l PE, hx aortofemoral bypass, hx celiac artery stent, hx SMA stent COPD on home oxygen (6 L), CAD s/p RCA stent, hx ischemic bowel surgery who presents with cough, wheezing, and acute shortness of breath. Patient had her first chemo treatment last week. As per , she has been coughing up blood at times. Patient presents in acute respiratory distress and was hypoxic to 80's on RA. Patient placed on immediate BIPAP after being rushed into Critical Care ED. Appropriate labs sent, EKG, CXR.  Patient had CT scan of chest to evaluate lung and for PE. Respiratory status improved with BIPAP.  Patient found to have multi-focal pneumonia and treated for HCAP. No PE on CT scan of chest.    ICU admission for sepsis, multifocal pneumonia, and respiratory distress.    9/25: intubated, NG feeds initiated  9/26: episode of afib w/ RVR, HR up to 160, cardioverted, started on amiodarone drip w/ goal of weaning to PO metoprolol    Acute hypoxemic respiratory failure  - did poorly on BiPAP, HFNC, persistently respiring >30  - now intubated, on fentanyl and propofol for sedation    HCAP vs chemo related pneumonitis. h/o Stage IV squamous cell carcinoma  - CT w/ IVC 9/23 w/ bilateral multifocal patchy airspace opacities consistent with pneumonia  - CXR 9/23 Bilateral, left greater than right interstitial and airspace opacities, new since prior.  - MRSA swab negative  - f/u tracheal culture  - c/w vancomycin, levaquin    COPD exacerbation  - on home oxygen (6L)  - s/w nebs q4h  - s/w solumedrol 80mg q8h    Afib w/ RVR, likely paroxysmal, likely secondary to long standing PAH  - EKG on admission showed afib  - Echo 9/26, EF 65-70%, consistent with severe pulmonary arterial hypertension  - s/p electrical cardioversion  - now rate controlled  - s/w amiodarone drip, might not be a good candidate for long term therapy due to lung condition  - s/w metoprolol 12.5 bid, titrate up if hemodynamically stable    h/o PE  - on therapeutic lovenox    Anxiety  - holding home ativan while on propofol sedation    GI PPx: protonix IV qd  DVT PPx: lovenox 70 bid

## 2018-09-26 NOTE — PROGRESS NOTE ADULT - SUBJECTIVE AND OBJECTIVE BOX
Patient is a 72y old  Female who presents with a chief complaint of SOB, wheezing (26 Sep 2018 08:59)    Over Night Events:  Afib with RVR s/p Electrical cardioversion;   Currently in sinus;     ROS:  See HPI    PHYSICAL EXAM    ICU Vital Signs Last 24 Hrs  T(C): 36 (26 Sep 2018 08:00), Max: 36.6 (25 Sep 2018 12:00)  T(F): 96.8 (26 Sep 2018 08:00), Max: 97.9 (25 Sep 2018 12:00)  HR: 86 (26 Sep 2018 09:00) (58 - 182)  BP: 96/47 (26 Sep 2018 09:00) (59/50 - 175/85)  BP(mean): 66 (26 Sep 2018 09:00) (55 - 124)  RR: 10 (26 Sep 2018 09:00) (10 - 51)  SpO2: 100% (26 Sep 2018 09:00) (94% - 100%)      General: NAD; sedated   HEENT: HAFSA             Lungs: Bilateral ronchi   Cardiovascular: Regular  Gastrointestinal: Soft, Positive BS  Extremities:  Moves extremities once off sedation.    Skin: Warm, intact  Neurological: No motor  deficit       09-25-18 @ 07:01  -  09-26-18 @ 07:00  --------------------------------------------------------  IN:    Enteral Tube Flush: 40 mL    fentaNYL Infusion.: 140.8 mL    IV PiggyBack: 690 mL    lactated ringers.: 1800 mL    Osmolite: 360 mL    propofol Infusion: 81.3 mL  Total IN: 3112.2 mL    OUT:    Indwelling Catheter - Urethral: 620 mL  Total OUT: 620 mL    Total NET: 2492.2 mL      09-26-18 @ 07:01  -  09-26-18 @ 09:22  --------------------------------------------------------  IN:    fentaNYL Infusion.: 6.6 mL    IV PiggyBack: 750 mL    lactated ringers.: 75 mL    Osmolite: 40 mL    propofol Infusion: 2 mL  Total IN: 873.6 mL    OUT:    Indwelling Catheter - Urethral: 20 mL  Total OUT: 20 mL    Total NET: 853.6 mL      LABS:                            8.6    17.28 )-----------( 307      ( 26 Sep 2018 04:43 )             28.4   09-26    137  |  100  |  33<H>  ----------------------------<  230<H>  4.8   |  24  |  0.8    Ca    9.3      26 Sep 2018 04:43    TPro  4.9<L>  /  Alb  2.6<L>  /  TBili  <0.2  /  DBili  x   /  AST  10  /  ALT  11  /  AlkPhos  77  09-25      Urinalysis Basic - ( 25 Sep 2018 14:05 )    Color: Red / Appearance: Turbid / SG: >=1.030 / pH: x  Gluc: x / Ketone: Trace  / Bili: Small / Urobili: 0.2   Blood: x / Protein: 100 / Nitrite: Negative   Leuk Esterase: Trace / RBC: >50 /HPF / WBC 3-5 /HPF   Sq Epi: x / Non Sq Epi: Few /HPF / Bacteria: Few /HPF     LIVER FUNCTIONS - ( 25 Sep 2018 04:20 )  Alb: 2.6 g/dL / Pro: 4.9 g/dL / ALK PHOS: 77 U/L / ALT: 11 U/L / AST: 10 U/L / GGT: x                                              Culture - Bronchial (collected 25 Sep 2018 14:20)  Source: .Broncial None  Gram Stain (26 Sep 2018 07:23):    Few Squamous epithelial cells per low power field    Few polymorphonuclear leukocytes per low power field    Rare Gram variable coccobacilli per oil power field    Numerous Yeast like cells per oil power field    Culture - Blood (collected 23 Sep 2018 16:46)  Source: .Blood None  Preliminary Report (24 Sep 2018 22:01):    No growth to date.    Culture - Blood (collected 23 Sep 2018 09:33)  Source: .Blood Blood  Preliminary Report (24 Sep 2018 18:01):    No growth to date.    Culture - Blood (collected 23 Sep 2018 09:33)  Source: .Blood Blood  Preliminary Report (24 Sep 2018 18:01):    No growth to date.                Mode: AC/ CMV (Assist Control/ Continuous Mandatory Ventilation)  RR (machine): 14  TV (machine): 400  FiO2: 60  PEEP: 7  ITime: 1  MAP: 13  PIP: 28    ABG - ( 26 Sep 2018 00:42 )  pH, Arterial: 7.38  pH, Blood: x     /  pCO2: 46    /  pO2: 97 / HCO3: 27    / Base Excess: 2.0   /  SaO2: 99        LA 1.6    MEDICATIONS  (STANDING):  amiodarone Infusion 1 mG/Min (33.333 mL/Hr) IV Continuous <Continuous>  amiodarone IVPB 150 milliGRAM(s) IV Intermittent once  cefepime   IVPB      cefepime   IVPB 2000 milliGRAM(s) IV Intermittent every 8 hours  chlorhexidine 4% Liquid 1 Application(s) Topical <User Schedule>  enoxaparin Injectable 70 milliGRAM(s) SubCutaneous every 12 hours  fentaNYL    Injectable 50 MICROGram(s) IV Push once  fentaNYL   Infusion. 0.5 MICROgram(s)/kG/Hr (3.28 mL/Hr) IV Continuous <Continuous>  lactated ringers. 1000 milliLiter(s) (75 mL/Hr) IV Continuous <Continuous>  levoFLOXacin IVPB 750 milliGRAM(s) IV Intermittent every 24 hours  levoFLOXacin IVPB      methylPREDNISolone sodium succinate Injectable 80 milliGRAM(s) IV Push every 8 hours  metoprolol succinate ER 25 milliGRAM(s) Oral daily  midazolam Injectable 1 milliGRAM(s) IV Push once  multivitamin 1 Tablet(s) Oral daily  pantoprazole  Injectable 40 milliGRAM(s) IV Push daily  propofol Infusion 15 MICROgram(s)/kG/Min (5.904 mL/Hr) IV Continuous <Continuous>  vancomycin  IVPB 750 milliGRAM(s) IV Intermittent <User Schedule>  vancomycin  IVPB 500 milliGRAM(s) IV Intermittent <User Schedule>    MEDICATIONS  (PRN):  ALBUTerol    90 MICROgram(s) HFA Inhaler 2 Puff(s) Inhalation every 4 hours PRN Shortness of Breath  ALBUTerol    90 MICROgram(s) HFA Inhaler 2 Puff(s) Inhalation every 4 hours PRN Shortness of Breath      Xrays:   Chest xray: Bilateral opacities worsening; ETT tube ok; OG tube ok  < from: Transthoracic Echocardiogram (09.24.18 @ 10:30) >  Summary:   1. Left ventricular ejection fraction, by visual estimation, is 65 to   70%.   2. Normal global left ventricular systolic function.   3. Spectral Doppler shows impaired relaxation pattern of left   ventricular myocardial filling (Grade I diastolic dysfunction).   4. Trivial aortic regurgitation.   5. Mild-to-moderate mitral regurgitation.   6. Mild-to-moderate tricuspid regurgitation.   7. Estimated pulmonary artery systolic pressure is 111.9 mmHg assuming a   right atrial pressure of 3 mmHg, which is consistent with severe   pulmonary hypertension.    < end of copied text >

## 2018-09-26 NOTE — CONSULT NOTE ADULT - SUBJECTIVE AND OBJECTIVE BOX
HPI:  73 y/o lady with PMH HTN, stage 4 lung CA, b/l PE, hx aortofemoral bypass, hx celiac artery stent, hx SMA stent COPD on home oxygen (6 L), CAD s/p RCA stent, hx ischemic bowel surgery who presents with cough, wheezing, and acute shortness of breath. Patient had her first chemo treatment last week. As per , she has been coughing up blood at times. Patient presents in acute respiratory distress and was hypoxic to 80's on RA. pt failed NIV and was intubated for acute hypoxic respiratory failure secondary to HCAP vs pneumonitis.  this am pt had new onset Afib with RVR after wich she became unstable with SBP of 60 , s/p DCCV 150j , she was converted back to sinus thereafter , and SBP imporved to 130.        PAST MEDICAL & SURGICAL HISTORY  Anxiety  Dyslipidemia  Pulmonary embolism  Lung cancer  H/O heart surgery: STENT PLACEMENT BYPASS AORTA CELIAC STENT  MI (myocardial infarction)  HTN (hypertension)  Aortocoronary bypass status  Altered bowel function: IN PAST HAD BOWEL SURGERY      FAMILY HISTORY:  FAMILY HISTORY:  Family history of diabetes mellitus (Child)  Family history of early CAD (Father)      SOCIAL HISTORY:  []smoker  []Alcohol  []Drug    ALLERGIES:  No Known Allergies      MEDICATIONS:  MEDICATIONS  (STANDING):  amiodarone Infusion 1 mG/Min (33.333 mL/Hr) IV Continuous <Continuous>  amiodarone IVPB 150 milliGRAM(s) IV Intermittent once  cefepime   IVPB      cefepime   IVPB 2000 milliGRAM(s) IV Intermittent every 8 hours  chlorhexidine 4% Liquid 1 Application(s) Topical <User Schedule>  enoxaparin Injectable 70 milliGRAM(s) SubCutaneous every 12 hours  fentaNYL    Injectable 50 MICROGram(s) IV Push once  fentaNYL   Infusion. 0.5 MICROgram(s)/kG/Hr (3.28 mL/Hr) IV Continuous <Continuous>  lactated ringers. 1000 milliLiter(s) (75 mL/Hr) IV Continuous <Continuous>  levoFLOXacin IVPB 750 milliGRAM(s) IV Intermittent every 24 hours  levoFLOXacin IVPB      methylPREDNISolone sodium succinate Injectable 80 milliGRAM(s) IV Push every 8 hours  midazolam Injectable 1 milliGRAM(s) IV Push once  multivitamin 1 Tablet(s) Oral daily  pantoprazole  Injectable 40 milliGRAM(s) IV Push daily  propofol Infusion 15 MICROgram(s)/kG/Min (5.904 mL/Hr) IV Continuous <Continuous>  vancomycin  IVPB 750 milliGRAM(s) IV Intermittent <User Schedule>  vancomycin  IVPB 500 milliGRAM(s) IV Intermittent <User Schedule>    MEDICATIONS  (PRN):  ALBUTerol    90 MICROgram(s) HFA Inhaler 2 Puff(s) Inhalation every 4 hours PRN Shortness of Breath  ALBUTerol    90 MICROgram(s) HFA Inhaler 2 Puff(s) Inhalation every 4 hours PRN Shortness of Breath      HOME MEDICATIONS:  Home Medications:  ezetimibe-simvastatin 10 mg-10 mg oral tablet: 1 tab(s) orally once a day (23 Sep 2018 17:41)  LORazepam 1 mg oral tablet: 1 tab(s) orally 3 times a day (23 Sep 2018 17:41)  Lovenox 40 mg/0.4 mL injectable solution: 70  injectable 2 times a day (23 Sep 2018 17:41)  lysine 500 mg oral tablet: 2 tab(s) orally once a day (23 Sep 2018 17:41)  Metoprolol Succinate ER 25 mg oral tablet, extended release: 1 tab(s) orally once a day (23 Sep 2018 17:41)  multivitamin: 1 tab(s) orally once a day (23 Sep 2018 17:41)  pantoprazole 40 mg oral delayed release tablet: 1 tab(s) orally once a day (23 Sep 2018 17:41)      VITALS:   T(F): 96.8 (09-26 @ 08:00), Max: 99.9 (09-23 @ 09:48)  HR: 102 (09-26 @ 08:00) (58 - 168)  BP: 106/66 (09-26 @ 07:30) (82/52 - 175/85)  BP(mean): 87 (09-26 @ 07:30) (56 - 124)  RR: 35 (09-26 @ 08:00) (12 - 51)  SpO2: 99% (09-26 @ 08:00) (83% - 100%)    I&O's Summary    25 Sep 2018 07:01  -  26 Sep 2018 07:00  --------------------------------------------------------  IN: 3112.2 mL / OUT: 620 mL / NET: 2492.2 mL    26 Sep 2018 07:01  -  26 Sep 2018 08:59  --------------------------------------------------------  IN: 123.6 mL / OUT: 20 mL / NET: 103.6 mL        REVIEW OF SYSTEMS:  unable to perform a review of sys , pt is untuted and sedated    PHYSICAL EXAM:  NEURO: intubated , sedated   NECK: no JVD  LUNGS: + rhonchi , with good bilateral air entry   CARDIOVASCULAR: RRR , no murmurs  ABD: Soft, non distended  EXT: No BRENDA      LABS:                        8.6    17.28 )-----------( 307      ( 26 Sep 2018 04:43 )             28.4     09-26    137  |  100  |  33<H>  ----------------------------<  230<H>  4.8   |  24  |  0.8    Ca    9.3      26 Sep 2018 04:43    TPro  4.9<L>  /  Alb  2.6<L>  /  TBili  <0.2  /  DBili  x   /  AST  10  /  ALT  11  /  AlkPhos  77  09-25          RADIOLOGY:  -CXR:< from: CT Chest w/ IV Cont (09.23.18 @ 11:54) >  IMPRESSION:     No central or lobar pulmonary embolism.    Bilateral multifocal patchy airspace opacities, compatible with   pneumonia. Small left pleural effusion.    Diffuse mediastinal lymphadenopathy, as well as conglomerate soft tissue   density within the subcarinal region, measuring up to 4.5 x 3.4 cm   (series 7 image 133). This causes mild mass effect upon the left mainstem   bronchus, although it still remains patent. Additionally there is more   significant mass effect upon the left atrium, just distal to the ostia of   the left superior and inferior pulmonary veins.  Cannot exclude cardiac   invasion.    1.4 cm right supraclavicular lymph node (series 8 image 35),     Bilateral upper lobe solid nodules measuring up to 1.1 cm.      < end of copied text >  2d echo : < from: Transthoracic Echocardiogram (09.24.18 @ 10:30) >   1. Left ventricular ejection fraction, by visual estimation, is 65 to   70%.   2. Normal global left ventricular systolic function.   3. Spectral Doppler shows impaired relaxation pattern of left   ventricular myocardial filling (Grade I diastolic dysfunction).   4. Trivial aortic regurgitation.   5. Mild-to-moderate mitral regurgitation.   6. Mild-to-moderate tricuspid regurgitation.   7. Estimated pulmonary artery systolic pressure is 111.9 mmHg assuming a   right atrial pressure of 3 mmHg, which is consistent with severe   pulmonary hypertension.    < end of copied text >      ECG:currently in NSR  pt had an episode of Afib with RVR at 160  ECG on th 25 : Afib with RVR HPI:  71 y/o female with PMH HTN, stage 4 lung CA, b/l PE, hx aortofemoral bypass, hx celiac artery stent, hx SMA stent COPD on home oxygen (6 L), CAD s/p RCA stent, hx ischemic bowel surgery who presents with cough, wheezing, and acute shortness of breath. Patient had her first chemo treatment last week. As per , she has been coughing up blood at times. Patient presents in acute respiratory distress and was hypoxic to 80's on RA. pt failed NIV and was intubated for acute hypoxic respiratory failure secondary to HCAP vs pneumonitis.  this am pt had new onset Afib with RVR after wich she became unstable with SBP of 60 , s/p DCCV 150j , she was converted back to sinus thereafter , and SBP imporved to 130.        PAST MEDICAL & SURGICAL HISTORY  Anxiety  Dyslipidemia  Pulmonary embolism  Lung cancer  H/O heart surgery: STENT PLACEMENT BYPASS AORTA CELIAC STENT  MI (myocardial infarction)  HTN (hypertension)  Aortocoronary bypass status  Altered bowel function: IN PAST HAD BOWEL SURGERY      FAMILY HISTORY:  FAMILY HISTORY:  Family history of diabetes mellitus (Child)  Family history of early CAD (Father)      SOCIAL HISTORY:  [x] former smoker  [x] social Alcohol  []Drug    ALLERGIES:  No Known Allergies      MEDICATIONS:  MEDICATIONS  (STANDING):  amiodarone Infusion 1 mG/Min (33.333 mL/Hr) IV Continuous <Continuous>  amiodarone IVPB 150 milliGRAM(s) IV Intermittent once  cefepime   IVPB      cefepime   IVPB 2000 milliGRAM(s) IV Intermittent every 8 hours  chlorhexidine 4% Liquid 1 Application(s) Topical <User Schedule>  enoxaparin Injectable 70 milliGRAM(s) SubCutaneous every 12 hours  fentaNYL    Injectable 50 MICROGram(s) IV Push once  fentaNYL   Infusion. 0.5 MICROgram(s)/kG/Hr (3.28 mL/Hr) IV Continuous <Continuous>  lactated ringers. 1000 milliLiter(s) (75 mL/Hr) IV Continuous <Continuous>  levoFLOXacin IVPB 750 milliGRAM(s) IV Intermittent every 24 hours  levoFLOXacin IVPB      methylPREDNISolone sodium succinate Injectable 80 milliGRAM(s) IV Push every 8 hours  midazolam Injectable 1 milliGRAM(s) IV Push once  multivitamin 1 Tablet(s) Oral daily  pantoprazole  Injectable 40 milliGRAM(s) IV Push daily  propofol Infusion 15 MICROgram(s)/kG/Min (5.904 mL/Hr) IV Continuous <Continuous>  vancomycin  IVPB 750 milliGRAM(s) IV Intermittent <User Schedule>  vancomycin  IVPB 500 milliGRAM(s) IV Intermittent <User Schedule>    MEDICATIONS  (PRN):  ALBUTerol    90 MICROgram(s) HFA Inhaler 2 Puff(s) Inhalation every 4 hours PRN Shortness of Breath  ALBUTerol    90 MICROgram(s) HFA Inhaler 2 Puff(s) Inhalation every 4 hours PRN Shortness of Breath      HOME MEDICATIONS:  Home Medications:  ezetimibe-simvastatin 10 mg-10 mg oral tablet: 1 tab(s) orally once a day (23 Sep 2018 17:41)  LORazepam 1 mg oral tablet: 1 tab(s) orally 3 times a day (23 Sep 2018 17:41)  Lovenox 40 mg/0.4 mL injectable solution: 70  injectable 2 times a day (23 Sep 2018 17:41)  lysine 500 mg oral tablet: 2 tab(s) orally once a day (23 Sep 2018 17:41)  Metoprolol Succinate ER 25 mg oral tablet, extended release: 1 tab(s) orally once a day (23 Sep 2018 17:41)  multivitamin: 1 tab(s) orally once a day (23 Sep 2018 17:41)  pantoprazole 40 mg oral delayed release tablet: 1 tab(s) orally once a day (23 Sep 2018 17:41)    Review of Systems  as above rest unobtainable     VITALS:   T(F): 96.8 (09-26 @ 08:00), Max: 99.9 (09-23 @ 09:48)  HR: 102 (09-26 @ 08:00) (58 - 168)  BP: 106/66 (09-26 @ 07:30) (82/52 - 175/85)  BP(mean): 87 (09-26 @ 07:30) (56 - 124)  RR: 35 (09-26 @ 08:00) (12 - 51)  SpO2: 99% (09-26 @ 08:00) (83% - 100%)    I&O's Summary    25 Sep 2018 07:01  -  26 Sep 2018 07:00  --------------------------------------------------------  IN: 3112.2 mL / OUT: 620 mL / NET: 2492.2 mL    26 Sep 2018 07:01  -  26 Sep 2018 08:59  --------------------------------------------------------  IN: 123.6 mL / OUT: 20 mL / NET: 103.6 mL        REVIEW OF SYSTEMS:  unable to perform a review of sys , pt is intubated and sedated    PHYSICAL EXAM:  NEURO: intubated , sedated   HEENMT: intubated  NECK: no JVD  LUNGS: + rhonchi , with good bilateral air entry   CARDIOVASCULAR: RRR , no murmurs  ABD: Soft, non distended  EXT: No BRENDA      LABS:                        8.6    17.28 )-----------( 307      ( 26 Sep 2018 04:43 )             28.4     09-26    137  |  100  |  33<H>  ----------------------------<  230<H>  4.8   |  24  |  0.8    Ca    9.3      26 Sep 2018 04:43    TPro  4.9<L>  /  Alb  2.6<L>  /  TBili  <0.2  /  DBili  x   /  AST  10  /  ALT  11  /  AlkPhos  77  09-25          RADIOLOGY:  -CXR:< from: CT Chest w/ IV Cont (09.23.18 @ 11:54) >  IMPRESSION:     No central or lobar pulmonary embolism.    Bilateral multifocal patchy airspace opacities, compatible with   pneumonia. Small left pleural effusion.    Diffuse mediastinal lymphadenopathy, as well as conglomerate soft tissue   density within the subcarinal region, measuring up to 4.5 x 3.4 cm   (series 7 image 133). This causes mild mass effect upon the left mainstem   bronchus, although it still remains patent. Additionally there is more   significant mass effect upon the left atrium, just distal to the ostia of   the left superior and inferior pulmonary veins.  Cannot exclude cardiac   invasion.    1.4 cm right supraclavicular lymph node (series 8 image 35),     Bilateral upper lobe solid nodules measuring up to 1.1 cm.      < end of copied text >  2d echo : < from: Transthoracic Echocardiogram (09.24.18 @ 10:30) >   1. Left ventricular ejection fraction, by visual estimation, is 65 to   70%.   2. Normal global left ventricular systolic function.   3. Spectral Doppler shows impaired relaxation pattern of left   ventricular myocardial filling (Grade I diastolic dysfunction).   4. Trivial aortic regurgitation.   5. Mild-to-moderate mitral regurgitation.   6. Mild-to-moderate tricuspid regurgitation.   7. Estimated pulmonary artery systolic pressure is 111.9 mmHg assuming a   right atrial pressure of 3 mmHg, which is consistent with severe   pulmonary hypertension.    < end of copied text >      ECG:currently in NSR  pt had an episode of Afib with RVR at 160  ECG on th 25 : Afib with RVR

## 2018-09-26 NOTE — PROGRESS NOTE ADULT - SUBJECTIVE AND OBJECTIVE BOX
CHIEF COMPLAINT:  Patient is a 72y old Female who presents with a chief complaint of SOB, wheezing (26 Sep 2018 09:22)    Currently admitted to medicine with the primary diagnosis of Multifocal pneumonia     Today is hospital day 3d. This morning she is resting comfortably in bed and reports no new issues or overnight events.     PAST MEDICAL & SURGICAL HISTORY  Anxiety  Dyslipidemia  Pulmonary embolism  Lung cancer  H/O heart surgery: STENT PLACEMENT BYPASS AORTA CELIAC STENT  MI (myocardial infarction)  HTN (hypertension)  Aortocoronary bypass status  Altered bowel function: IN PAST HAD BOWEL SURGERY    SOCIAL HISTORY:  Negative for smoking/alcohol/drug use.     ALLERGIES:  No Known Allergies    MEDICATIONS:  STANDING MEDICATIONS  ALBUTerol    90 MICROgram(s) HFA Inhaler 1 Puff(s) Inhalation every 4 hours  amiodarone Infusion 1 mG/Min IV Continuous <Continuous>  cefepime   IVPB      cefepime   IVPB 2000 milliGRAM(s) IV Intermittent every 8 hours  chlorhexidine 0.12% Liquid 15 milliLiter(s) Swish and Spit two times a day  chlorhexidine 4% Liquid 1 Application(s) Topical <User Schedule>  enoxaparin Injectable 70 milliGRAM(s) SubCutaneous every 12 hours  fentaNYL   Infusion. 0.5 MICROgram(s)/kG/Hr IV Continuous <Continuous>  levoFLOXacin IVPB 750 milliGRAM(s) IV Intermittent every 24 hours  levoFLOXacin IVPB      methylPREDNISolone sodium succinate Injectable 80 milliGRAM(s) IV Push every 8 hours  metoprolol succinate ER 12.5 milliGRAM(s) Oral daily  midazolam Infusion 0.02 mG/kG/Hr IV Continuous <Continuous>  multivitamin 1 Tablet(s) Oral daily  pantoprazole  Injectable 40 milliGRAM(s) IV Push daily  vancomycin  IVPB 750 milliGRAM(s) IV Intermittent <User Schedule>  vancomycin  IVPB 500 milliGRAM(s) IV Intermittent <User Schedule>    PRN MEDICATIONS  ALBUTerol    90 MICROgram(s) HFA Inhaler 2 Puff(s) Inhalation every 4 hours PRN    VITALS:   T(F): 98  HR: 64  BP: 100/49  RR: 13  SpO2: 98%    LABS:                        8.6    17.28 )-----------( 307      ( 26 Sep 2018 04:43 )             28.4     09-26    137  |  100  |  33<H>  ----------------------------<  230<H>  4.8   |  24  |  0.8    Ca    9.3      26 Sep 2018 04:43    TPro  4.9<L>  /  Alb  2.6<L>  /  TBili  <0.2  /  DBili  x   /  AST  10  /  ALT  11  /  AlkPhos  77  09-25    Urinalysis Basic - ( 25 Sep 2018 14:05 )    Color: Red / Appearance: Turbid / SG: >=1.030 / pH: x  Gluc: x / Ketone: Trace  / Bili: Small / Urobili: 0.2   Blood: x / Protein: 100 / Nitrite: Negative   Leuk Esterase: Trace / RBC: >50 /HPF / WBC 3-5 /HPF   Sq Epi: x / Non Sq Epi: Few /HPF / Bacteria: Few /HPF    ABG - ( 26 Sep 2018 05:37 )  pH, Arterial: 7.39  pH, Blood: x     /  pCO2: 45    /  pO2: 97    / HCO3: 27    / Base Excess: 1.9   /  SaO2: 99        Troponin T, Serum: <0.01 ng/mL (09-26-18 @ 11:38)    Culture - Bronchial (collected 25 Sep 2018 14:20)  Source: .Broncial None  Gram Stain (26 Sep 2018 07:23):    Few Squamous epithelial cells per low power field    Few polymorphonuclear leukocytes per low power field    Rare Gram variable coccobacilli per oil power field    Numerous Yeast like cells per oil power field    Culture - Blood (collected 23 Sep 2018 16:46)  Source: .Blood None  Preliminary Report (24 Sep 2018 22:01):    No growth to date.    CARDIAC MARKERS ( 26 Sep 2018 11:38 )  x     / <0.01 ng/mL / x     / x     / x        RADIOLOGY:  < from: Xray Chest 1 View-PORTABLE IMMEDIATE (09.25.18 @ 10:37) >  Support devices: Endotracheal tube tip above the huyen. NG tube tip just   distal to the EG junction.. Telemetry leads.    Impression:    Bilateral opacities and left pleural effusion, unchanged. Support devices as described.  < end of copied text >    < from: Transthoracic Echocardiogram (09.24.18 @ 10:30) >   1. Left ventricular ejection fraction, by visual estimation, is 65 to 70%.   2. Normal global left ventricular systolic function.   3. Spectral Doppler shows impaired relaxation pattern of left ventricular myocardial filling (Grade I diastolic dysfunction).   4. Trivial aortic regurgitation.   5. Mild-to-moderate mitral regurgitation.   6. Mild-to-moderate tricuspid regurgitation.   7. Estimated pulmonary artery systolic pressure is 111.9 mmHg assuming a right atrial pressure of 3 mmHg, which is consistent with severe pulmonary hypertension.  < end of copied text >    PHYSICAL EXAM:  GEN: intubated  PULM: b/l wheezes  CARD: S1/S2 present. RRR.   GI: Soft, non-distended. Bowel sounds present  NEURO: sedated

## 2018-09-26 NOTE — CONSULT NOTE ADULT - SUBJECTIVE AND OBJECTIVE BOX
71 y/o lady with PMH HTN, stage 4 lung CA, b/l PE, hx aortofemoral bypass, hx celiac artery stent, hx SMA stent COPD on home oxygen (6 L), CAD s/p RCA stent, hx ischemic bowel surgery who presents with cough, wheezing, and acute shortness of breath. Patient had her first chemo treatment last week. As per , she has been coughing up blood at times. Patient presents in acute respiratory distress and was hypoxic to 80's on RA. Patient placed on immediate BIPAP after being rushed into Critical Care ED. Appropriate labs sent, EKG, CXR.  Patient had CT scan of chest to evaluate lung and for PE. Respiratory status improved with BIPAP.  Patient found to have multi-focal pneumonia and treated for HCAP. No PE on CT scan of chest.  pt intubated 9/25  CXR + worsening bilat infiltrates  + A fib with RVR this morning, s/p cardioversion, now in NSR      EXAM:  vent dependent and sedated, min responsive  OG feeding tube (not [place]!)  abd soft, ND  skin turgor fair  anicteric, conj pale  no CVC  T(C): 36.1 (26 Sep 2018 16:00), Max: 36.7 (26 Sep 2018 12:00)  T(F): 97 (26 Sep 2018 16:00), Max: 98 (26 Sep 2018 12:00)  HR: 70 (26 Sep 2018 17:00) (58 - 182)  BP: 101/50 (26 Sep 2018 17:00) (59/50 - 153/64)  BP(mean): 68 (26 Sep 2018 17:00) (55 - 99)  RR: 13 (26 Sep 2018 17:00) (10 - 51)  Height (cm): 165.1 (23 Sep 2018 18:00)  Weight (kg): 65.6 (23 Sep 2018 18:00)  BMI (kg/m2): 24.1 (23 Sep 2018 18:00)  BSA (m2): 1.72 (23 Sep 2018 18:00)    MEDICATIONS  (STANDING):  ALBUTerol    90 MICROgram(s) HFA Inhaler 1 Puff(s) Inhalation every 4 hours  amiodarone Infusion 0.5 mG/Min (16.667 mL/Hr) IV Continuous <Continuous>  cefepime   IVPB 2000 milliGRAM(s) IV Intermittent every 8 hours  chlorhexidine 0.12% Liquid 15 milliLiter(s) Swish and Spit two times a day  chlorhexidine 4% Liquid 1 Application(s) Topical <User Schedule>  enoxaparin Injectable 70 milliGRAM(s) SubCutaneous every 12 hours  fentaNYL   Infusion. 0.5 MICROgram(s)/kG/Hr (3.28 mL/Hr) IV Continuous <Continuous>  levoFLOXacin IVPB 750 milliGRAM(s) IV Intermittent every 24 hours   methylPREDNISolone sodium succinate Injectable 80 milliGRAM(s) IV Push every 8 hours  metoprolol tartrate 12.5 milliGRAM(s) Oral two times a day  midazolam Infusion 0.02 mG/kG/Hr (1.312 mL/Hr) IV Continuous <Continuous>  multivitamin 1 Tablet(s) Oral daily  pantoprazole  Injectable 40 milliGRAM(s) IV Push daily  vancomycin  IVPB 750 milliGRAM(s) IV Intermittent <User Schedule>  pt receiving and tolerating Osmolite 1.0 at 50 ml/h today                        8.6    17.28 )-----------( 307      ( 26 Sep 2018 04:43 )    --- RDW 19.6,  elevated % PMN             28.4   09-26    137  |  100  |  33<H>  ----------------------------<  230<H>  4.8   |  24  |  0.8    Ca    9.3      26 Sep 2018 04:43  Mg     2.0     09-26    TPro  4.9<L>  /  Alb  2.6<L>  /  TBili  <0.2  /  DBili  x   /  AST  10  /  ALT  11  /  AlkPhos  77  09-25  pO2/FIO2 = 157

## 2018-09-26 NOTE — CONSULT NOTE ADULT - ASSESSMENT
IMP:  - lung cancer s/p first course of chemo  - COPD exacerbation vs pneumonia  - ARDS  - anemia  - AF with RVR s/p cardioversion this morning  - h/o PE, CAD, h/o abdominal surgery ? r/t ischemic bowel    SUGGEST:  - clarify abdominal surgery history  - PSE est of kcal utilization = 1221, IJE = 1488   ---   REE study would be the gold standard, if available  - due to consideration of ARDS, change feeds to Peptamen AF. 50 ml/h would provide 91 gm protein (approx 1.5/kg IBW/d) and 1440 kcal/d with lower omega 6:3 ratio and lower carb content (c/w pCO2 issues d/w residents).  - follow glc r/t steroid treatments

## 2018-09-26 NOTE — PROGRESS NOTE ADULT - ASSESSMENT
IMPRESSION:    Acute hypoxemic respiratory failure on mechanical ventilation   HCAP VS pneumonitis vs lymphangitic spread  HO Stage IV squamous cell carcinoma  COPD exacerbation  HO PE on Lovenox  Afib with RVR s/p cardioversion     PLAN:    CNS: Sedation with fentanyl/ versed.     HEENT: Oral care     PULMONARY:  HOB @ 45 degrees.  Nebs Q 4.  c/w Solumedrol 80 mg Q8. Vent changes: Increase PEEP to 10     CARDIOVASCULAR:  DC IVF; Keep I<O; Lasix IV; Amio drip for now; FU cardio; CE x2     GI: GI prophylaxis.  OG feeding. Bowel regimen     RENAL:  Follow up lytes.  Correct as needed; Check Mg    INFECTIOUS DISEASE: Follow up cultures.  FU Vancomycin trough       HEMATOLOGICAL:  DVT TX    ENDOCRINE:  Follow up FS.  Insulin protocol if needed. TSH     GOC DW  and patient     Keep Donato for now  AI positive IMPRESSION:    Acute hypoxemic respiratory failure on mechanical ventilation   HCAP VS pneumonitis vs lymphangitic spread  HO Stage IV squamous cell carcinoma  COPD exacerbation  HO PE on Lovenox  Afib with RVR s/p cardioversion     PLAN:    CNS: Sedation with fentanyl/ versed.     HEENT: Oral care     PULMONARY:  HOB @ 45 degrees.  Nebs Q 4.  c/w Solumedrol 80 mg Q8. Vent changes: Increase PEEP to 10.  FiO2 50%    CARDIOVASCULAR:  DC IVF; Keep I<O; Lasix IV; Amio drip for now; FU cardio; CE x2     GI: GI prophylaxis.  OG feeding. Bowel regimen     RENAL:  Follow up lytes.  Correct as needed; Check Mg    INFECTIOUS DISEASE: Follow up cultures.  FU Vancomycin trough       HEMATOLOGICAL:  DVT TX    ENDOCRINE:  Follow up FS.  Insulin protocol if needed. TSH     GOC DW  and patient     Keep Donato for now  AI positive  GOC DW

## 2018-09-26 NOTE — CONSULT NOTE ADULT - ASSESSMENT
73 y/o lady with PMH HTN, stage 4 lung CA, b/l PE, hx aortofemoral bypass, hx celiac artery stent, hx SMA stent COPD on home oxygen (6 L), CAD s/p RCA stent, hx ischemic bowel surgery who presents with cough, wheezing, and acute shortness of breath. Patient had her first chemo treatment last week. As per , she has been coughing up blood at times. Patient presents in acute respiratory distress and was hypoxic to 80's on RA. pt failed NIV and was intubated for acute hypoxic respiratory failure secondary to HCAP vs pneumonitis.  this am pt had new onset Afib with RVR after wich she became unstable with SBP of 60 , s/p DCCV 150j , she was converted back to sinus thereafter , and SBP imporved to 130.    CAD/PVD  Hx of PE  stage 4 lung CA  Afib : multifactorial : secondary to long standing PHTN , vs mass invasion of left atrium    plan:  s/p successful DCCV with 150J, currently in sinus rythm  continue with AC if no contre indication  start amiodarone drip for now , to note that pt might not be a good candidate for long term Amiodarone  restart metoprolol , and titrate up to control HR if afib reccurs as long as pt is hemodynamically stable  check TSH, FT4  will follow 71 y/o lady with PMH HTN, stage 4 lung CA, b/l PE, hx aortofemoral bypass, hx celiac artery stent, hx SMA stent COPD on home oxygen (6 L), CAD s/p RCA stent, hx ischemic bowel surgery who presents with cough, wheezing, and acute shortness of breath. Patient had her first chemo treatment last week. As per , she has been coughing up blood at times. Patient presents in acute respiratory distress and was hypoxic to 80's on RA. pt failed NIV and was intubated for acute hypoxic respiratory failure secondary to HCAP vs pneumonitis.  this am pt had new onset Afib with RVR after wich she became unstable with SBP of 60 , s/p DCCV 150j , she was converted back to sinus thereafter , and SBP imporved to 130.    CAD/PVD  Hx of PE  stage 4 lung CA  Afib : multifactorial : secondary to long standing PHTN , vs mass invasion of left atrium    plan:  s/p successful DCCV with 150J, currently in sinus rythm  continue with AC if no contraindication  start amiodarone drip for now , to note that pt might not be a good candidate for long term Amiodarone  restart metoprolol , and titrate up to control HR if afib reccurs as long as pt is hemodynamically stable  check TSH, FT4  will follow

## 2018-09-27 LAB
ANION GAP SERPL CALC-SCNC: 13 MMOL/L — SIGNIFICANT CHANGE UP (ref 7–14)
BASE EXCESS BLDA CALC-SCNC: 3.6 MMOL/L — HIGH (ref -2–2)
BASOPHILS # BLD AUTO: 0.01 K/UL — SIGNIFICANT CHANGE UP (ref 0–0.2)
BASOPHILS NFR BLD AUTO: 0 % — SIGNIFICANT CHANGE UP (ref 0–1)
BUN SERPL-MCNC: 40 MG/DL — HIGH (ref 10–20)
CALCIUM SERPL-MCNC: 9.5 MG/DL — SIGNIFICANT CHANGE UP (ref 8.5–10.1)
CHLORIDE SERPL-SCNC: 97 MMOL/L — LOW (ref 98–110)
CO2 SERPL-SCNC: 25 MMOL/L — SIGNIFICANT CHANGE UP (ref 17–32)
CREAT SERPL-MCNC: 1 MG/DL — SIGNIFICANT CHANGE UP (ref 0.7–1.5)
EOSINOPHIL # BLD AUTO: 0 K/UL — SIGNIFICANT CHANGE UP (ref 0–0.7)
EOSINOPHIL NFR BLD AUTO: 0 % — SIGNIFICANT CHANGE UP (ref 0–8)
GAS PNL BLDA: SIGNIFICANT CHANGE UP
GLUCOSE BLDC GLUCOMTR-MCNC: 196 MG/DL — HIGH (ref 70–99)
GLUCOSE BLDC GLUCOMTR-MCNC: 260 MG/DL — HIGH (ref 70–99)
GLUCOSE BLDC GLUCOMTR-MCNC: 291 MG/DL — HIGH (ref 70–99)
GLUCOSE SERPL-MCNC: 255 MG/DL — HIGH (ref 70–99)
HCO3 BLDA-SCNC: 31 MMOL/L — HIGH (ref 23–27)
HCT VFR BLD CALC: 29.4 % — LOW (ref 37–47)
HGB BLD-MCNC: 9 G/DL — LOW (ref 12–16)
HOROWITZ INDEX BLDA+IHG-RTO: 50 — SIGNIFICANT CHANGE UP
IMM GRANULOCYTES NFR BLD AUTO: 1 % — HIGH (ref 0.1–0.3)
LYMPHOCYTES # BLD AUTO: 0.34 K/UL — LOW (ref 1.2–3.4)
LYMPHOCYTES # BLD AUTO: 1.7 % — LOW (ref 20.5–51.1)
MAGNESIUM SERPL-MCNC: 2.1 MG/DL — SIGNIFICANT CHANGE UP (ref 1.8–2.4)
MCHC RBC-ENTMCNC: 26.5 PG — LOW (ref 27–31)
MCHC RBC-ENTMCNC: 30.6 G/DL — LOW (ref 32–37)
MCV RBC AUTO: 86.7 FL — SIGNIFICANT CHANGE UP (ref 81–99)
MONOCYTES # BLD AUTO: 0.46 K/UL — SIGNIFICANT CHANGE UP (ref 0.1–0.6)
MONOCYTES NFR BLD AUTO: 2.3 % — SIGNIFICANT CHANGE UP (ref 1.7–9.3)
NEUTROPHILS # BLD AUTO: 19.32 K/UL — HIGH (ref 1.4–6.5)
NEUTROPHILS NFR BLD AUTO: 95 % — HIGH (ref 42.2–75.2)
NRBC # BLD: 0 /100 WBCS — SIGNIFICANT CHANGE UP (ref 0–0)
PCO2 BLDA: 56 MMHG — HIGH (ref 38–42)
PH BLDA: 7.34 — LOW (ref 7.38–7.42)
PLATELET # BLD AUTO: 334 K/UL — SIGNIFICANT CHANGE UP (ref 130–400)
PO2 BLDA: 85 MMHG — SIGNIFICANT CHANGE UP (ref 78–95)
POTASSIUM SERPL-MCNC: 4.4 MMOL/L — SIGNIFICANT CHANGE UP (ref 3.5–5)
POTASSIUM SERPL-SCNC: 4.4 MMOL/L — SIGNIFICANT CHANGE UP (ref 3.5–5)
RBC # BLD: 3.39 M/UL — LOW (ref 4.2–5.4)
RBC # FLD: 18.8 % — HIGH (ref 11.5–14.5)
SAO2 % BLDA: 97 % — SIGNIFICANT CHANGE UP (ref 94–98)
SODIUM SERPL-SCNC: 135 MMOL/L — SIGNIFICANT CHANGE UP (ref 135–146)
T4 AB SER-ACNC: 4.8 UG/DL — SIGNIFICANT CHANGE UP (ref 4.6–12)
TROPONIN T SERPL-MCNC: <0.01 NG/ML — SIGNIFICANT CHANGE UP
TSH SERPL-MCNC: 0.73 UIU/ML — SIGNIFICANT CHANGE UP (ref 0.27–4.2)
VANCOMYCIN TROUGH SERPL-MCNC: 21.9 UG/ML — HIGH (ref 5–10)
WBC # BLD: 20.33 K/UL — HIGH (ref 4.8–10.8)
WBC # FLD AUTO: 20.33 K/UL — HIGH (ref 4.8–10.8)

## 2018-09-27 RX ORDER — INSULIN LISPRO 100/ML
3 VIAL (ML) SUBCUTANEOUS ONCE
Qty: 0 | Refills: 0 | Status: COMPLETED | OUTPATIENT
Start: 2018-09-27 | End: 2018-09-27

## 2018-09-27 RX ORDER — DEXTROSE 50 % IN WATER 50 %
25 SYRINGE (ML) INTRAVENOUS ONCE
Qty: 0 | Refills: 0 | Status: DISCONTINUED | OUTPATIENT
Start: 2018-09-27 | End: 2018-09-28

## 2018-09-27 RX ORDER — INSULIN GLARGINE 100 [IU]/ML
8 INJECTION, SOLUTION SUBCUTANEOUS AT BEDTIME
Qty: 0 | Refills: 0 | Status: DISCONTINUED | OUTPATIENT
Start: 2018-09-27 | End: 2018-09-28

## 2018-09-27 RX ORDER — GLUCAGON INJECTION, SOLUTION 0.5 MG/.1ML
1 INJECTION, SOLUTION SUBCUTANEOUS ONCE
Qty: 0 | Refills: 0 | Status: DISCONTINUED | OUTPATIENT
Start: 2018-09-27 | End: 2018-09-28

## 2018-09-27 RX ORDER — PANTOPRAZOLE SODIUM 20 MG/1
40 TABLET, DELAYED RELEASE ORAL
Qty: 0 | Refills: 0 | Status: DISCONTINUED | OUTPATIENT
Start: 2018-09-27 | End: 2018-09-28

## 2018-09-27 RX ORDER — AMIODARONE HYDROCHLORIDE 400 MG/1
200 TABLET ORAL DAILY
Qty: 0 | Refills: 0 | Status: DISCONTINUED | OUTPATIENT
Start: 2018-09-27 | End: 2018-09-28

## 2018-09-27 RX ORDER — FUROSEMIDE 40 MG
40 TABLET ORAL ONCE
Qty: 0 | Refills: 0 | Status: COMPLETED | OUTPATIENT
Start: 2018-09-27 | End: 2018-09-27

## 2018-09-27 RX ORDER — DEXTROSE 50 % IN WATER 50 %
15 SYRINGE (ML) INTRAVENOUS ONCE
Qty: 0 | Refills: 0 | Status: DISCONTINUED | OUTPATIENT
Start: 2018-09-27 | End: 2018-09-28

## 2018-09-27 RX ORDER — DEXTROSE 50 % IN WATER 50 %
12.5 SYRINGE (ML) INTRAVENOUS ONCE
Qty: 0 | Refills: 0 | Status: DISCONTINUED | OUTPATIENT
Start: 2018-09-27 | End: 2018-09-28

## 2018-09-27 RX ORDER — SENNA PLUS 8.6 MG/1
2 TABLET ORAL AT BEDTIME
Qty: 0 | Refills: 0 | Status: DISCONTINUED | OUTPATIENT
Start: 2018-09-27 | End: 2018-09-28

## 2018-09-27 RX ORDER — SODIUM CHLORIDE 9 MG/ML
1000 INJECTION, SOLUTION INTRAVENOUS
Qty: 0 | Refills: 0 | Status: DISCONTINUED | OUTPATIENT
Start: 2018-09-27 | End: 2018-09-28

## 2018-09-27 RX ORDER — DOCUSATE SODIUM 100 MG
100 CAPSULE ORAL
Qty: 0 | Refills: 0 | Status: DISCONTINUED | OUTPATIENT
Start: 2018-09-27 | End: 2018-09-28

## 2018-09-27 RX ADMIN — Medication 80 MILLIGRAM(S): at 05:39

## 2018-09-27 RX ADMIN — Medication 250 MILLIGRAM(S): at 07:33

## 2018-09-27 RX ADMIN — CEFEPIME 100 MILLIGRAM(S): 1 INJECTION, POWDER, FOR SOLUTION INTRAMUSCULAR; INTRAVENOUS at 05:39

## 2018-09-27 RX ADMIN — Medication 3 UNIT(S): at 22:10

## 2018-09-27 RX ADMIN — CHLORHEXIDINE GLUCONATE 1 APPLICATION(S): 213 SOLUTION TOPICAL at 05:40

## 2018-09-27 RX ADMIN — AMIODARONE HYDROCHLORIDE 200 MILLIGRAM(S): 400 TABLET ORAL at 13:17

## 2018-09-27 RX ADMIN — CHLORHEXIDINE GLUCONATE 15 MILLILITER(S): 213 SOLUTION TOPICAL at 17:33

## 2018-09-27 RX ADMIN — Medication 1 TABLET(S): at 12:40

## 2018-09-27 RX ADMIN — Medication 12.5 MILLIGRAM(S): at 05:40

## 2018-09-27 RX ADMIN — Medication 40 MILLIGRAM(S): at 02:37

## 2018-09-27 RX ADMIN — CEFEPIME 100 MILLIGRAM(S): 1 INJECTION, POWDER, FOR SOLUTION INTRAMUSCULAR; INTRAVENOUS at 21:36

## 2018-09-27 RX ADMIN — CHLORHEXIDINE GLUCONATE 15 MILLILITER(S): 213 SOLUTION TOPICAL at 05:39

## 2018-09-27 RX ADMIN — Medication 60 MILLIGRAM(S): at 17:33

## 2018-09-27 RX ADMIN — ENOXAPARIN SODIUM 70 MILLIGRAM(S): 100 INJECTION SUBCUTANEOUS at 17:34

## 2018-09-27 RX ADMIN — Medication 12.5 MILLIGRAM(S): at 17:33

## 2018-09-27 RX ADMIN — ENOXAPARIN SODIUM 70 MILLIGRAM(S): 100 INJECTION SUBCUTANEOUS at 05:40

## 2018-09-27 RX ADMIN — Medication 100 MILLIGRAM(S): at 17:33

## 2018-09-27 RX ADMIN — INSULIN GLARGINE 8 UNIT(S): 100 INJECTION, SOLUTION SUBCUTANEOUS at 21:48

## 2018-09-27 RX ADMIN — Medication 3 UNIT(S): at 01:20

## 2018-09-27 RX ADMIN — CEFEPIME 100 MILLIGRAM(S): 1 INJECTION, POWDER, FOR SOLUTION INTRAMUSCULAR; INTRAVENOUS at 13:16

## 2018-09-27 RX ADMIN — SENNA PLUS 2 TABLET(S): 8.6 TABLET ORAL at 21:36

## 2018-09-27 RX ADMIN — PANTOPRAZOLE SODIUM 40 MILLIGRAM(S): 20 TABLET, DELAYED RELEASE ORAL at 13:17

## 2018-09-27 NOTE — PROGRESS NOTE ADULT - SUBJECTIVE AND OBJECTIVE BOX
Patient is a 72y old  Female who presents with a chief complaint of SOB, wheezing (26 Sep 2018 17:14)        Over Night Events:  Remains on MV.  Off pressors.  Sedated.  SP cardioversion yesterday.          ROS:  See HPI    PHYSICAL EXAM    ICU Vital Signs Last 24 Hrs  T(C): 36.2 (27 Sep 2018 08:00), Max: 36.7 (26 Sep 2018 12:00)  T(F): 97.1 (27 Sep 2018 08:00), Max: 98 (26 Sep 2018 12:00)  HR: 66 (27 Sep 2018 08:00) (58 - 110)  BP: 96/51 (27 Sep 2018 08:00) (86/52 - 153/64)  BP(mean): 67 (27 Sep 2018 08:00) (61 - 99)  ABP: --  ABP(mean): --  RR: 13 (27 Sep 2018 08:00) (10 - 51)  SpO2: 97% (27 Sep 2018 08:00) (96% - 100%)      General: Sedated.    HEENT: + ET            Lymphatic system: No cervical LN   Lungs: Bilateral BS  Cardiovascular: Regular   Gastrointestinal: Soft, Positive BS  Extremities: No clubbing.  Moves extremities.  Full Range of motion   Skin: Warm, intact  Neurological: No motor deficit       09-26-18 @ 07:01  -  09-27-18 @ 07:00  --------------------------------------------------------  IN:    amiodarone Infusion: 233.1 mL    amiodarone Infusion: 233.6 mL    Enteral Tube Flush: 50 mL    fentaNYL Infusion.: 301.4 mL    IV PiggyBack: 1250 mL    lactated ringers.: 150 mL    midazolam Infusion: 64 mL    Osmolite: 1190 mL    propofol Infusion: 2 mL  Total IN: 3474.1 mL    OUT:    Indwelling Catheter - Urethral: 2575 mL  Total OUT: 2575 mL    Total NET: 899.1 mL          LABS:                            9.0    20.33 )-----------( 334      ( 27 Sep 2018 04:23 )             29.4                                               09-27    135  |  97<L>  |  40<H>  ----------------------------<  255<H>  4.4   |  25  |  1.0    Ca    9.5      27 Sep 2018 04:23  Mg     2.1     09-27                                               Urinalysis Basic - ( 25 Sep 2018 14:05 )    Color: Red / Appearance: Turbid / SG: >=1.030 / pH: x  Gluc: x / Ketone: Trace  / Bili: Small / Urobili: 0.2   Blood: x / Protein: 100 / Nitrite: Negative   Leuk Esterase: Trace / RBC: >50 /HPF / WBC 3-5 /HPF   Sq Epi: x / Non Sq Epi: Few /HPF / Bacteria: Few /HPF        CARDIAC MARKERS ( 27 Sep 2018 04:23 )  x     / <0.01 ng/mL / x     / x     / x      CARDIAC MARKERS ( 26 Sep 2018 21:07 )  x     / <0.01 ng/mL / x     / x     / x      CARDIAC MARKERS ( 26 Sep 2018 11:38 )  x     / <0.01 ng/mL / x     / x     / x                                                                                         Culture - Bronchial (collected 25 Sep 2018 14:20)  Source: .Broncial None  Gram Stain (26 Sep 2018 07:23):    Few Squamous epithelial cells per low power field    Few polymorphonuclear leukocytes per low power field    Rare Gram variable coccobacilli per oil power field    Numerous Yeast like cells per oil power field  Preliminary Report (26 Sep 2018 19:17):    Normal Respiratory Nelly present                                                   Mode: AC/ CMV (Assist Control/ Continuous Mandatory Ventilation)  RR (machine): 14  TV (machine): 400  FiO2: 50  PEEP: 10  ITime: 1  MAP: 15  PIP: 28                                      ABG - ( 26 Sep 2018 14:02 )  pH, Arterial: x     pH, Blood: 7.33     /  pCO2: 55    /  pO2: 74    / HCO3: 28    / Base Excess: 3.2   /  SaO2: 98                  MEDICATIONS  (STANDING):  ALBUTerol    90 MICROgram(s) HFA Inhaler 1 Puff(s) Inhalation every 4 hours  amiodarone Infusion 0.5 mG/Min (16.667 mL/Hr) IV Continuous <Continuous>  cefepime   IVPB      cefepime   IVPB 2000 milliGRAM(s) IV Intermittent every 8 hours  chlorhexidine 0.12% Liquid 15 milliLiter(s) Swish and Spit two times a day  chlorhexidine 4% Liquid 1 Application(s) Topical <User Schedule>  enoxaparin Injectable 70 milliGRAM(s) SubCutaneous every 12 hours  fentaNYL   Infusion. 0.5 MICROgram(s)/kG/Hr (3.28 mL/Hr) IV Continuous <Continuous>  levoFLOXacin IVPB 750 milliGRAM(s) IV Intermittent every 24 hours  levoFLOXacin IVPB      methylPREDNISolone sodium succinate Injectable 80 milliGRAM(s) IV Push every 8 hours  metoprolol tartrate 12.5 milliGRAM(s) Oral two times a day  midazolam Infusion 0.02 mG/kG/Hr (1.312 mL/Hr) IV Continuous <Continuous>  multivitamin 1 Tablet(s) Oral daily  pantoprazole  Injectable 40 milliGRAM(s) IV Push daily  vancomycin  IVPB 750 milliGRAM(s) IV Intermittent <User Schedule>  vancomycin  IVPB 500 milliGRAM(s) IV Intermittent <User Schedule>    MEDICATIONS  (PRN):  ALBUTerol    90 MICROgram(s) HFA Inhaler 2 Puff(s) Inhalation every 4 hours PRN Shortness of Breath      Xrays:          ET OG >  Improving infiltrates                                                                          ECHO

## 2018-09-27 NOTE — PROGRESS NOTE ADULT - ASSESSMENT
IMPRESSION:    Acute hypoxemic respiratory failure  HCAP VS pneumonitis improving   HO Stage IV squamous cell carcinoma  COPD exacerbation improved   HO PE on Lovenox  Afib with RVR s/p cardioversion     PLAN:    CNS:  SAT.   Sedation with fentanyl/ versed.     HEENT: Oral care     PULMONARY:  HOB @ 45 degrees.  Nebs Q 4.  c/w Solumedrol 60 mg Q.12 Repeat ABG in 4 hours and wean O2 (ABG doen when patient was not in synch with the vent)    CARDIOVASCULAR:  I=O.  Continue AMIo.  FU with Cardiology    GI: GI prophylaxis.  OG feeding. Bowel regimen     RENAL:  Follow up lytes.  Correct as needed; Check Mg    INFECTIOUS DISEASE: Follow up cultures.  FU Vancomycin trough       HEMATOLOGICAL:  DVT TX    ENDOCRINE:  Follow up FS.  Insulin protocol if needed. TSH     GOC SOPHIE  and patient     Keep Donato for now  AI positive  GOC SOPHIE

## 2018-09-27 NOTE — PROGRESS NOTE ADULT - SUBJECTIVE AND OBJECTIVE BOX
SUBJ: Intubated, sedated on Vent        MEDICATIONS  (STANDING):  ALBUTerol    90 MICROgram(s) HFA Inhaler 1 Puff(s) Inhalation every 4 hours  amiodarone Infusion 0.5 mG/Min (16.667 mL/Hr) IV Continuous <Continuous>  cefepime   IVPB      cefepime   IVPB 2000 milliGRAM(s) IV Intermittent every 8 hours  chlorhexidine 0.12% Liquid 15 milliLiter(s) Swish and Spit two times a day  chlorhexidine 4% Liquid 1 Application(s) Topical <User Schedule>  enoxaparin Injectable 70 milliGRAM(s) SubCutaneous every 12 hours  fentaNYL   Infusion. 0.5 MICROgram(s)/kG/Hr (3.28 mL/Hr) IV Continuous <Continuous>  levoFLOXacin IVPB 750 milliGRAM(s) IV Intermittent every 24 hours  levoFLOXacin IVPB      methylPREDNISolone sodium succinate Injectable 60 milliGRAM(s) IV Push every 12 hours  metoprolol tartrate 12.5 milliGRAM(s) Oral two times a day  midazolam Infusion 0.02 mG/kG/Hr (1.312 mL/Hr) IV Continuous <Continuous>  multivitamin 1 Tablet(s) Oral daily  pantoprazole    Tablet 40 milliGRAM(s) Oral before breakfast  vancomycin  IVPB 750 milliGRAM(s) IV Intermittent <User Schedule>  vancomycin  IVPB 500 milliGRAM(s) IV Intermittent <User Schedule>    MEDICATIONS  (PRN):  ALBUTerol    90 MICROgram(s) HFA Inhaler 2 Puff(s) Inhalation every 4 hours PRN Shortness of Breath            Vital Signs Last 24 Hrs  T(C): 36.2 (27 Sep 2018 08:00), Max: 36.7 (26 Sep 2018 12:00)  T(F): 97.1 (27 Sep 2018 08:00), Max: 98 (26 Sep 2018 12:00)  HR: 66 (27 Sep 2018 08:00) (58 - 110)  BP: 96/51 (27 Sep 2018 08:00) (86/52 - 153/64)  BP(mean): 67 (27 Sep 2018 08:00) (61 - 99)  RR: 13 (27 Sep 2018 08:00) (12 - 51)  SpO2: 97% (27 Sep 2018 08:00) (96% - 100%)    REVIEW OF SYSTEMS: unobtainable-intubated  NEURO: intubated , sedated   HEENMT: intubated  NECK: no JVD  LUNGS: + rhonchi , with good bilateral air entry   CARDIOVASCULAR: RRR , no murmurs  ABD: Soft, non distended  EXT: No BRENDA      ·  LABS:                        8.6    17.28 )-----------( 307      ( 26 Sep 2018 04:43 )             28.4     09-26    137  |  100  |  33<H>  ----------------------------<  230<H>  4.8   |  24  |  0.8    Ca    9.3      26 Sep 2018 04:43    TPro  4.9<L>  /  Alb  2.6<L>  /  TBili  <0.2  /  DBili  x   /  AST  10  /  ALT  11  /  AlkPhos  77  09-25          RADIOLOGY:  -CXR:< from: CT Chest w/ IV Cont (09.23.18 @ 11:54) >  IMPRESSION:     No central or lobar pulmonary embolism.    Bilateral multifocal patchy airspace opacities, compatible with   pneumonia. Small left pleural effusion.    Diffuse mediastinal lymphadenopathy, as well as conglomerate soft tissue   density within the subcarinal region, measuring up to 4.5 x 3.4 cm   (series 7 image 133). This causes mild mass effect upon the left mainstem   bronchus, although it still remains patent. Additionally there is more   significant mass effect upon the left atrium, just distal to the ostia of   the left superior and inferior pulmonary veins.  Cannot exclude cardiac   invasion.    1.4 cm right supraclavicular lymph node (series 8 image 35),     Bilateral upper lobe solid nodules measuring up to 1.1 cm.      < end of copied text >  2d echo : < from: Transthoracic Echocardiogram (09.24.18 @ 10:30) >   1. Left ventricular ejection fraction, by visual estimation, is 65 to   70%.   2. Normal global left ventricular systolic function.   3. Spectral Doppler shows impaired relaxation pattern of left   ventricular myocardial filling (Grade I diastolic dysfunction).   4. Trivial aortic regurgitation.   5. Mild-to-moderate mitral regurgitation.   6. Mild-to-moderate tricuspid regurgitation.   7. Estimated pulmonary artery systolic pressure is 111.9 mmHg assuming a   right atrial pressure of 3 mmHg, which is consistent with severe   pulmonary hypertension.    < end of copied text >      ECG:currently in NSR  pt had an episode of Afib with RVR at 160  ECG on th 25 : Afib with RVR      Assessment and Recommendation:   · Assessment	  71 y/o lady with PMH HTN, stage 4 lung CA, b/l PE, hx aortofemoral bypass, hx celiac artery stent, hx SMA stent COPD on home oxygen (6 L), CAD s/p RCA stent, hx ischemic bowel surgery who presents with cough, wheezing, and acute shortness of breath. Patient had her first chemo treatment last week. As per , she has been coughing up blood at times. Patient presents in acute respiratory distress and was hypoxic to 80's on RA. pt failed NIV and was intubated for acute hypoxic respiratory failure secondary to HCAP vs pneumonitis.  had Afib with RVR yesterday after which she became unstable with SBP of 60 , s/p DCCV 150j , she was converted back to sinus thereafter , and SBP improved to 130.    CAD/PVD  Hx of PE  stage 4 lung CA  Afib : multifactorial : secondary to long standing PHTN , vs mass invasion of left atrium    plan:  s/p successful DCCV with 150J, currently in sinus rythm  continue with AC if no contraindication  Finish IV amiodarone load. If unable to give oral Amiodarone(200mg daily) would continue maintenance drip  , pt might not be a good candidate for long term Amiodarone secondary to lung disease  continue metoprolol , and titrate up to control HR if afib recurs as long as pt is hemodynamically stable  will follow    Plan discussed with housestaff

## 2018-09-27 NOTE — PROGRESS NOTE ADULT - ASSESSMENT
73 y/o lady with PMH HTN, stage 4 lung CA, h/o b/l PE, hx aortofemoral bypass, hx celiac artery stent, hx SMA stent COPD on home oxygen (6 L), CAD s/p RCA stent, hx ischemic bowel surgery who presents with cough, wheezing, and acute shortness of breath. Patient had her first chemo treatment last week. As per , she has been coughing up blood at times. Patient presents in acute respiratory distress and was hypoxic to 80's on RA. Patient placed on immediate BIPAP after being rushed into Critical Care ED. Appropriate labs sent, EKG, CXR.  Patient had CT scan of chest to evaluate lung and for PE. Respiratory status improved with BIPAP.  Patient found to have multi-focal pneumonia and treated for HCAP. No PE on CT scan of chest.    ICU admission for sepsis, multifocal pneumonia, and respiratory distress.    9/25: intubated, OG feeds initiated  9/26: episode of afib w/ RVR, HR up to 160, cardioverted, started on amiodarone drip w/ goal of weaning to PO metoprolol    Acute hypoxemic respiratory failure  - did poorly on BiPAP, HFNC, persistently respiring >30  - now intubated, on fentanyl and versed for sedation  - SAT daily  - plan for SBT tomorrow    HCAP vs chemo related pneumonitis. h/o Stage IV squamous cell carcinoma  - CT w/ IVC 9/23 w/ bilateral multifocal patchy airspace opacities consistent with pneumonia  - CXR 9/23 Bilateral, left greater than right interstitial and airspace opacities, new since prior.  - MRSA swab negative  - f/u tracheal culture (prelim, normal respiratory angel)  - c/w vancomycin, levaquin    COPD exacerbation  - on home oxygen (6L)  - s/w nebs q4h  - s/w solumedrol 60mg q12h    Afib w/ RVR, likely paroxysmal, likely secondary to long standing PAH  - EKG on admission showed afib  - Echo 9/26, EF 65-70%, consistent with severe pulmonary arterial hypertension  - s/p electrical cardioversion  - now rate controlled  - transition amiodarone drip to PO  - on AC as below  - c/w metoprolol 12.5 bid, titrate up for rate control if hemodynamically stable    h/o PE  - on therapeutic lovenox    Anxiety  - holding home ativan while on versed sedation    Diet: Peptamen@50mL/hr through OG tube  GI PPx: protonix IV qd  DVT PPx: lovenox 70 bid

## 2018-09-27 NOTE — PROVIDER CONTACT NOTE (OTHER) - SITUATION
Pt noted with [id number], pt not receiving any coverage. Pt also with u/o- 30ml/hr. As per order, MD notified that I>O.

## 2018-09-27 NOTE — PROGRESS NOTE ADULT - SUBJECTIVE AND OBJECTIVE BOX
CHIEF COMPLAINT:  Patient is a 72y old Female who presents with a chief complaint of SOB, wheezing (27 Sep 2018 09:08)    Currently admitted to medicine with the primary diagnosis of Multifocal pneumonia     Today is hospital day 4d. This morning she is resting comfortably in bed and reports no new issues or overnight events.     PAST MEDICAL & SURGICAL HISTORY  Anxiety  Dyslipidemia  Pulmonary embolism  Lung cancer  H/O heart surgery: STENT PLACEMENT BYPASS AORTA CELIAC STENT  MI (myocardial infarction)  HTN (hypertension)  Aortocoronary bypass status  Altered bowel function: IN PAST HAD BOWEL SURGERY    SOCIAL HISTORY:  Negative for smoking/alcohol/drug use.     ALLERGIES:  No Known Allergies    MEDICATIONS:  STANDING MEDICATIONS  ALBUTerol    90 MICROgram(s) HFA Inhaler 1 Puff(s) Inhalation every 4 hours  amiodarone    Tablet 200 milliGRAM(s) Oral daily  amiodarone Infusion 0.5 mG/Min IV Continuous <Continuous>  cefepime   IVPB      cefepime   IVPB 2000 milliGRAM(s) IV Intermittent every 8 hours  chlorhexidine 0.12% Liquid 15 milliLiter(s) Swish and Spit two times a day  chlorhexidine 4% Liquid 1 Application(s) Topical <User Schedule>  docusate sodium Liquid 100 milliGRAM(s) Oral two times a day  enoxaparin Injectable 70 milliGRAM(s) SubCutaneous every 12 hours  fentaNYL   Infusion. 0.5 MICROgram(s)/kG/Hr IV Continuous <Continuous>  levoFLOXacin IVPB 750 milliGRAM(s) IV Intermittent every 24 hours  levoFLOXacin IVPB      methylPREDNISolone sodium succinate Injectable 60 milliGRAM(s) IV Push every 12 hours  metoprolol tartrate 12.5 milliGRAM(s) Oral two times a day  midazolam Infusion 0.02 mG/kG/Hr IV Continuous <Continuous>  multivitamin 1 Tablet(s) Oral daily  pantoprazole    Tablet 40 milliGRAM(s) Oral before breakfast  senna 2 Tablet(s) Oral at bedtime  vancomycin  IVPB 750 milliGRAM(s) IV Intermittent <User Schedule>  vancomycin  IVPB 500 milliGRAM(s) IV Intermittent <User Schedule>    PRN MEDICATIONS  ALBUTerol    90 MICROgram(s) HFA Inhaler 2 Puff(s) Inhalation every 4 hours PRN    VITALS:   T(F): 97.1  HR: 62  BP: 97/48  RR: 13  SpO2: 100%    LABS:             9.0    20.33 )-----------( 334      ( 27 Sep 2018 04:23 )             29.4     09-27    135  |  97<L>  |  40<H>  ----------------------------<  255<H>  4.4   |  25  |  1.0    Ca    9.5      27 Sep 2018 04:23  Mg     2.1     09-27    Urinalysis Basic - ( 25 Sep 2018 14:05 )    Color: Red / Appearance: Turbid / SG: >=1.030 / pH: x  Gluc: x / Ketone: Trace  / Bili: Small / Urobili: 0.2   Blood: x / Protein: 100 / Nitrite: Negative   Leuk Esterase: Trace / RBC: >50 /HPF / WBC 3-5 /HPF   Sq Epi: x / Non Sq Epi: Few /HPF / Bacteria: Few /HPF    ABG - ( 27 Sep 2018 07:31 )  pH, Arterial: 7.33  pH, Blood: x     /  pCO2: 55    /  pO2: 74    / HCO3: 29    / Base Excess: 2.2   /  SaO2: 95        Troponin T, Serum: <0.01 ng/mL (09-27-18 @ 04:23)  Troponin T, Serum: <0.01 ng/mL (09-26-18 @ 21:07)    Culture - Bronchial (collected 25 Sep 2018 14:20)  Source: .Broncial None  Gram Stain (26 Sep 2018 07:23):    Few Squamous epithelial cells per low power field    Few polymorphonuclear leukocytes per low power field    Rare Gram variable coccobacilli per oil power field    Numerous Yeast like cells per oil power field  Preliminary Report (26 Sep 2018 19:17):    Normal Respiratory Nelly present    CARDIAC MARKERS ( 27 Sep 2018 04:23 )  x     / <0.01 ng/mL / x     / x     / x      CARDIAC MARKERS ( 26 Sep 2018 21:07 )  x     / <0.01 ng/mL / x     / x     / x      CARDIAC MARKERS ( 26 Sep 2018 11:38 )  x     / <0.01 ng/mL / x     / x     / x        RADIOLOGY:  < from: Xray Chest 1 View-PORTABLE IMMEDIATE (09.25.18 @ 10:37) >  Support devices: Endotracheal tube tip above the huyen. NG tube tip just   distal to the EG junction.. Telemetry leads.    Impression:    Bilateral opacities and left pleural effusion, unchanged. Support devices as described.  < end of copied text >    < from: Transthoracic Echocardiogram (09.24.18 @ 10:30) >   1. Left ventricular ejection fraction, by visual estimation, is 65 to 70%.   2. Normal global left ventricular systolic function.   3. Spectral Doppler shows impaired relaxation pattern of left ventricular myocardial filling (Grade I diastolic dysfunction).   4. Trivial aortic regurgitation.   5. Mild-to-moderate mitral regurgitation.   6. Mild-to-moderate tricuspid regurgitation.   7. Estimated pulmonary artery systolic pressure is 111.9 mmHg assuming a right atrial pressure of 3 mmHg, which is consistent with severe pulmonary hypertension.  < end of copied text >    PHYSICAL EXAM:  GEN: intubated  PULM: no wheezes  CARD: S1/S2 present. RRR.   GI: Soft, non-distended. Bowel sounds present  NEURO: sedated

## 2018-09-28 LAB
ALBUMIN SERPL ELPH-MCNC: 2.6 G/DL — LOW (ref 3.5–5.2)
ALP SERPL-CCNC: 81 U/L — SIGNIFICANT CHANGE UP (ref 30–115)
ALT FLD-CCNC: 11 U/L — SIGNIFICANT CHANGE UP (ref 0–41)
ANION GAP SERPL CALC-SCNC: 15 MMOL/L — HIGH (ref 7–14)
AST SERPL-CCNC: 11 U/L — SIGNIFICANT CHANGE UP (ref 0–41)
BASOPHILS # BLD AUTO: 0.01 K/UL — SIGNIFICANT CHANGE UP (ref 0–0.2)
BASOPHILS NFR BLD AUTO: 0 % — SIGNIFICANT CHANGE UP (ref 0–1)
BILIRUB SERPL-MCNC: <0.2 MG/DL — SIGNIFICANT CHANGE UP (ref 0.2–1.2)
BUN SERPL-MCNC: 43 MG/DL — HIGH (ref 10–20)
CALCIUM SERPL-MCNC: 9.2 MG/DL — SIGNIFICANT CHANGE UP (ref 8.5–10.1)
CHLORIDE SERPL-SCNC: 97 MMOL/L — LOW (ref 98–110)
CO2 SERPL-SCNC: 23 MMOL/L — SIGNIFICANT CHANGE UP (ref 17–32)
CREAT SERPL-MCNC: 1.1 MG/DL — SIGNIFICANT CHANGE UP (ref 0.7–1.5)
CULTURE RESULTS: SIGNIFICANT CHANGE UP
EOSINOPHIL # BLD AUTO: 0 K/UL — SIGNIFICANT CHANGE UP (ref 0–0.7)
EOSINOPHIL NFR BLD AUTO: 0 % — SIGNIFICANT CHANGE UP (ref 0–8)
ESTIMATED AVERAGE GLUCOSE: 186 MG/DL — HIGH (ref 68–114)
GLUCOSE BLDC GLUCOMTR-MCNC: 179 MG/DL — HIGH (ref 70–99)
GLUCOSE SERPL-MCNC: 278 MG/DL — HIGH (ref 70–99)
HBA1C BLD-MCNC: 8.1 % — HIGH (ref 4–5.6)
HCT VFR BLD CALC: 29.3 % — LOW (ref 37–47)
HGB BLD-MCNC: 9.1 G/DL — LOW (ref 12–16)
IMM GRANULOCYTES NFR BLD AUTO: 1.7 % — HIGH (ref 0.1–0.3)
LYMPHOCYTES # BLD AUTO: 0.33 K/UL — LOW (ref 1.2–3.4)
LYMPHOCYTES # BLD AUTO: 1.6 % — LOW (ref 20.5–51.1)
MAGNESIUM SERPL-MCNC: 2 MG/DL — SIGNIFICANT CHANGE UP (ref 1.8–2.4)
MCHC RBC-ENTMCNC: 27.2 PG — SIGNIFICANT CHANGE UP (ref 27–31)
MCHC RBC-ENTMCNC: 31.1 G/DL — LOW (ref 32–37)
MCV RBC AUTO: 87.5 FL — SIGNIFICANT CHANGE UP (ref 81–99)
MONOCYTES # BLD AUTO: 1.01 K/UL — HIGH (ref 0.1–0.6)
MONOCYTES NFR BLD AUTO: 4.8 % — SIGNIFICANT CHANGE UP (ref 1.7–9.3)
NEUTROPHILS # BLD AUTO: 19.5 K/UL — HIGH (ref 1.4–6.5)
NEUTROPHILS NFR BLD AUTO: 91.9 % — HIGH (ref 42.2–75.2)
NRBC # BLD: 0 /100 WBCS — SIGNIFICANT CHANGE UP (ref 0–0)
PLATELET # BLD AUTO: 295 K/UL — SIGNIFICANT CHANGE UP (ref 130–400)
POTASSIUM SERPL-MCNC: 4.6 MMOL/L — SIGNIFICANT CHANGE UP (ref 3.5–5)
POTASSIUM SERPL-SCNC: 4.6 MMOL/L — SIGNIFICANT CHANGE UP (ref 3.5–5)
PROT SERPL-MCNC: 4.9 G/DL — LOW (ref 6–8)
RBC # BLD: 3.35 M/UL — LOW (ref 4.2–5.4)
RBC # FLD: 18.2 % — HIGH (ref 11.5–14.5)
SODIUM SERPL-SCNC: 135 MMOL/L — SIGNIFICANT CHANGE UP (ref 135–146)
SPECIMEN SOURCE: SIGNIFICANT CHANGE UP
VANCOMYCIN TROUGH SERPL-MCNC: 20.7 UG/ML — HIGH (ref 5–10)
WBC # BLD: 21.2 K/UL — HIGH (ref 4.8–10.8)
WBC # FLD AUTO: 21.2 K/UL — HIGH (ref 4.8–10.8)

## 2018-09-28 RX ORDER — MORPHINE SULFATE 50 MG/1
2 CAPSULE, EXTENDED RELEASE ORAL ONCE
Qty: 0 | Refills: 0 | Status: DISCONTINUED | OUTPATIENT
Start: 2018-09-28 | End: 2018-09-28

## 2018-09-28 RX ORDER — MORPHINE SULFATE 50 MG/1
10 CAPSULE, EXTENDED RELEASE ORAL
Qty: 100 | Refills: 0 | Status: DISCONTINUED | OUTPATIENT
Start: 2018-09-28 | End: 2018-09-28

## 2018-09-28 RX ORDER — MORPHINE SULFATE 50 MG/1
4 CAPSULE, EXTENDED RELEASE ORAL ONCE
Qty: 0 | Refills: 0 | Status: DISCONTINUED | OUTPATIENT
Start: 2018-09-28 | End: 2018-09-28

## 2018-09-28 RX ORDER — MORPHINE SULFATE 50 MG/1
4 CAPSULE, EXTENDED RELEASE ORAL
Qty: 0 | Refills: 0 | Status: DISCONTINUED | OUTPATIENT
Start: 2018-09-28 | End: 2018-09-28

## 2018-09-28 RX ORDER — MORPHINE SULFATE 50 MG/1
4 CAPSULE, EXTENDED RELEASE ORAL ONCE
Qty: 0 | Refills: 0 | Status: COMPLETED | OUTPATIENT
Start: 2018-09-28 | End: 2018-09-28

## 2018-09-28 RX ORDER — MORPHINE SULFATE 50 MG/1
4 CAPSULE, EXTENDED RELEASE ORAL
Qty: 250 | Refills: 0 | Status: DISCONTINUED | OUTPATIENT
Start: 2018-09-28 | End: 2018-09-28

## 2018-09-28 RX ORDER — METOPROLOL TARTRATE 50 MG
25 TABLET ORAL
Qty: 0 | Refills: 0 | Status: DISCONTINUED | OUTPATIENT
Start: 2018-09-28 | End: 2018-09-28

## 2018-09-28 RX ORDER — IPRATROPIUM/ALBUTEROL SULFATE 18-103MCG
3 AEROSOL WITH ADAPTER (GRAM) INHALATION EVERY 4 HOURS
Qty: 0 | Refills: 0 | Status: DISCONTINUED | OUTPATIENT
Start: 2018-09-28 | End: 2018-09-28

## 2018-09-28 RX ADMIN — ENOXAPARIN SODIUM 70 MILLIGRAM(S): 100 INJECTION SUBCUTANEOUS at 18:39

## 2018-09-28 RX ADMIN — ENOXAPARIN SODIUM 70 MILLIGRAM(S): 100 INJECTION SUBCUTANEOUS at 05:01

## 2018-09-28 RX ADMIN — MORPHINE SULFATE 4 MILLIGRAM(S): 50 CAPSULE, EXTENDED RELEASE ORAL at 19:17

## 2018-09-28 RX ADMIN — CHLORHEXIDINE GLUCONATE 15 MILLILITER(S): 213 SOLUTION TOPICAL at 05:01

## 2018-09-28 RX ADMIN — Medication 60 MILLIGRAM(S): at 05:01

## 2018-09-28 RX ADMIN — Medication 2 MILLIGRAM(S): at 16:28

## 2018-09-28 RX ADMIN — Medication 2 MILLIGRAM(S): at 15:45

## 2018-09-28 RX ADMIN — Medication 0.5 MILLIGRAM(S): at 11:58

## 2018-09-28 RX ADMIN — PANTOPRAZOLE SODIUM 40 MILLIGRAM(S): 20 TABLET, DELAYED RELEASE ORAL at 06:01

## 2018-09-28 RX ADMIN — AMIODARONE HYDROCHLORIDE 200 MILLIGRAM(S): 400 TABLET ORAL at 05:03

## 2018-09-28 RX ADMIN — Medication 2 MILLIGRAM(S): at 20:30

## 2018-09-28 RX ADMIN — Medication 60 MILLIGRAM(S): at 18:38

## 2018-09-28 RX ADMIN — MORPHINE SULFATE 2 MILLIGRAM(S): 50 CAPSULE, EXTENDED RELEASE ORAL at 14:16

## 2018-09-28 RX ADMIN — MORPHINE SULFATE 2 MILLIGRAM(S): 50 CAPSULE, EXTENDED RELEASE ORAL at 13:41

## 2018-09-28 RX ADMIN — MORPHINE SULFATE 4 MG/HR: 50 CAPSULE, EXTENDED RELEASE ORAL at 18:28

## 2018-09-28 RX ADMIN — MORPHINE SULFATE 10 MG/HR: 50 CAPSULE, EXTENDED RELEASE ORAL at 19:17

## 2018-09-28 RX ADMIN — MORPHINE SULFATE 2 MILLIGRAM(S): 50 CAPSULE, EXTENDED RELEASE ORAL at 14:30

## 2018-09-28 RX ADMIN — Medication 2 MILLIGRAM(S): at 14:45

## 2018-09-28 RX ADMIN — CHLORHEXIDINE GLUCONATE 1 APPLICATION(S): 213 SOLUTION TOPICAL at 05:02

## 2018-09-28 RX ADMIN — CEFEPIME 100 MILLIGRAM(S): 1 INJECTION, POWDER, FOR SOLUTION INTRAMUSCULAR; INTRAVENOUS at 05:02

## 2018-09-28 RX ADMIN — Medication 250 MILLIGRAM(S): at 07:47

## 2018-09-28 RX ADMIN — MORPHINE SULFATE 4 MILLIGRAM(S): 50 CAPSULE, EXTENDED RELEASE ORAL at 18:35

## 2018-09-28 RX ADMIN — Medication 2 MILLIGRAM(S): at 18:42

## 2018-09-28 RX ADMIN — Medication 100 MILLIGRAM(S): at 05:03

## 2018-09-28 RX ADMIN — Medication 2 MILLIGRAM(S): at 22:11

## 2018-09-28 RX ADMIN — MORPHINE SULFATE 2 MILLIGRAM(S): 50 CAPSULE, EXTENDED RELEASE ORAL at 13:30

## 2018-09-28 RX ADMIN — MORPHINE SULFATE 4 MILLIGRAM(S): 50 CAPSULE, EXTENDED RELEASE ORAL at 20:11

## 2018-09-28 RX ADMIN — CEFEPIME 100 MILLIGRAM(S): 1 INJECTION, POWDER, FOR SOLUTION INTRAMUSCULAR; INTRAVENOUS at 13:44

## 2018-09-28 RX ADMIN — MORPHINE SULFATE 4 MILLIGRAM(S): 50 CAPSULE, EXTENDED RELEASE ORAL at 22:09

## 2018-09-28 NOTE — PROGRESS NOTE ADULT - ASSESSMENT
73 y/o lady with PMH HTN, stage 4 lung CA, h/o b/l PE, hx aortofemoral bypass, hx celiac artery stent, hx SMA stent COPD on home oxygen (6 L), CAD s/p RCA stent, hx ischemic bowel surgery who presents with cough, wheezing, and acute shortness of breath. Patient had her first chemo treatment last week. As per , she has been coughing up blood at times. Patient presents in acute respiratory distress and was hypoxic to 80's on RA. Patient placed on immediate BIPAP after being rushed into Critical Care ED. Appropriate labs sent, EKG, CXR.  Patient had CT scan of chest to evaluate lung and for PE. Respiratory status improved with BIPAP.  Patient found to have multi-focal pneumonia and treated for HCAP. No PE on CT scan of chest.    ICU admission for sepsis, multifocal pneumonia, and respiratory distress.    9/25: intubated, OG feeds initiated  9/26: episode of afib w/ RVR, HR up to 160, cardioverted, started on amiodarone drip w/ goal of weaning to PO metoprolol  9/27: extubated    Acute hypoxemic respiratory failure  - did poorly on BiPAP, HFNC, persistently respiring >30 and was intubated  - now extubated  - now on BiPAP, will transition to HFNC  - will follow respiratory status, f/u repeat ABG    HCAP vs chemo related pneumonitis. h/o Stage IV squamous cell carcinoma  - CT w/ IVC 9/23 w/ bilateral multifocal patchy airspace opacities consistent with pneumonia  - CXR 9/23 Bilateral, left greater than right interstitial and airspace opacities, new since prior.  - MRSA swab negative  - tracheal culture w/ normal respiratory angel  - c/w cefepime, levaquin  - d/c vancomycin    COPD exacerbation  - on home oxygen (6L)  - c/w nebs q4h  - c/w solumedrol 60mg q12h    Afib w/ RVR, likely paroxysmal, likely secondary to long standing PAH  - EKG on admission showed afib  - Echo 9/26, EF 65-70%, consistent with severe pulmonary arterial hypertension  - s/p electrical cardioversion  - now rate controlled  - c/w amiodarone PO  - on AC as below  - s/t metoprolol 25 bid, titrate up for rate control if hemodynamically stable    h/o PE  - on therapeutic lovenox    Anxiety  - holding home ativan while on versed sedation    GI PPx: protonix IV qd  DVT PPx: lovenox 70 bid

## 2018-09-28 NOTE — GOALS OF CARE CONVERSATION - PERSONAL ADVANCE DIRECTIVE - CONVERSATION DETAILS
Dr Dupree spoke to family,(  and daughter), current plan of care and management explained, family wish to continue all cares at this. time
Palliative care team met with patient's family at bedside to discuss goals of care. Discussed current diagnosis, prognosis and treatment options. Family verbalized understanding of patients very complicated medical conditions. Patient's son is en route to see patient from [place]. At this time family wishes to withdraw continuos BiPAP support when her son arrives. Family is aware that patient's condition will most likely not improve and she may eventual pass. Family relating their wish is for patient to remain comfortable. No further blood work or finger sticks. No artifical hydration or IV nutrition. DNR/DNI    Plan to remove BiPAP support when son arrives.  Comfort measures only.  DNR/DNI

## 2018-09-28 NOTE — CONSULT NOTE ADULT - SUBJECTIVE AND OBJECTIVE BOX
REQUESTED OF: DR mays     CLINICAL ISSUE TO BE EVALUATED BY CONSULTANT: Goals of care          HPI:  71 y/o lady with PMH HTN, stage 4 lung CA, b/l PE, hx aortofemoral bypass, hx celiac artery stent, hx SMA stent COPD on home oxygen (6 L), CAD s/p RCA stent, hx ischemic bowel surgery who presents with cough, wheezing, and acute shortness of breath. Patient had her first chemo treatment last week. As per , she has been coughing up blood at times. Patient presents in acute respiratory distress and was hypoxic to 80's on RA. Patient placed on immediate BIPAP after being rushed into Critical Care ED. Appropriate labs sent, EKG, CXR.  Patient had CT scan of chest to evaluate lung and for PE. Respiratory status improved with BIPAP.  Patient found to have multi-focal pneumonia and treated for HCAP. No PE on CT scan of chest.    ICU admission for sepsis, multifocal pneumonia, and respiratory distress.         PAST MEDICAL & SURGICAL HISTORY:  Anxiety  Dyslipidemia  Pulmonary embolism  Lung cancer  H/O heart surgery: STENT PLACEMENT BYPASS AORTA CELIAC STENT  MI (myocardial infarction)  HTN (hypertension)  Aortocoronary bypass status  Altered bowel function: IN PAST HAD BOWEL SURGERY        PHYSICAL EXAM:  well groomed elderly female, in respiratory distress/ agitation  PERRL  S1S2 RRR  soft, non distended/tender  moves all extremities.             T(C): 37.3, Max: 37.3 (12:00)  HR: 110 (58 - 118)  BP: 154/65 (94/45 - 176/87)  RR: 28 (13 - 35)  SpO2: 92% (86% - 100%)      LABS/STUDIES:  09-28    135  |  97<L>  |  43<H>  ----------------------------<  278<H>  4.6   |  23  |  1.1    Ca    9.2      28 Sep 2018 04:34  Mg     2.0     09-28    TPro  4.9<L>  /  Alb  2.6<L>  /  TBili  <0.2  /  DBili  x   /  AST  11  /  ALT  11  /  AlkPhos  81  09-28                            9.1    21.20 )-----------( 295      ( 28 Sep 2018 04:34 )             29.3       MEDICATIONS  (STANDING):  ALBUTerol/ipratropium for Nebulization 3 milliLiter(s) Nebulizer every 4 hours  amiodarone    Tablet 200 milliGRAM(s) Oral daily  amiodarone Infusion 0.5 mG/Min (16.667 mL/Hr) IV Continuous <Continuous>  cefepime   IVPB      cefepime   IVPB 2000 milliGRAM(s) IV Intermittent every 8 hours  chlorhexidine 0.12% Liquid 15 milliLiter(s) Swish and Spit two times a day  chlorhexidine 4% Liquid 1 Application(s) Topical <User Schedule>  dextrose 5%. 1000 milliLiter(s) (50 mL/Hr) IV Continuous <Continuous>  dextrose 50% Injectable 12.5 Gram(s) IV Push once  dextrose 50% Injectable 25 Gram(s) IV Push once  dextrose 50% Injectable 25 Gram(s) IV Push once  docusate sodium Liquid 100 milliGRAM(s) Oral two times a day  enoxaparin Injectable 70 milliGRAM(s) SubCutaneous every 12 hours  insulin glargine Injectable (LANTUS) 8 Unit(s) SubCutaneous at bedtime  levoFLOXacin IVPB 750 milliGRAM(s) IV Intermittent every 24 hours  levoFLOXacin IVPB      LORazepam   Injectable 2 milliGRAM(s) IV Push once  LORazepam   Injectable 2 milliGRAM(s) IV Push once  methylPREDNISolone sodium succinate Injectable 60 milliGRAM(s) IV Push every 12 hours  metoprolol tartrate 25 milliGRAM(s) Oral two times a day  multivitamin 1 Tablet(s) Oral daily  pantoprazole    Tablet 40 milliGRAM(s) Oral before breakfast  senna 2 Tablet(s) Oral at bedtime    MEDICATIONS  (PRN):  ALBUTerol    90 MICROgram(s) HFA Inhaler 2 Puff(s) Inhalation every 4 hours PRN Shortness of Breath  dextrose 40% Gel 15 Gram(s) Oral once PRN Blood Glucose LESS THAN 70 milliGRAM(s)/deciliter  glucagon  Injectable 1 milliGRAM(s) IntraMuscular once PRN Glucose LESS THAN 70 milligrams/deciliter  LORazepam   Injectable 2 milliGRAM(s) IV Push every 4 hours PRN distress        [place] Symptom Assesment Scale      PPS (Palliative Performance Scale):

## 2018-09-28 NOTE — DISCHARGE NOTE FOR THE EXPIRED PATIENT - HOSPITAL COURSE
71 y/o lady with PMH HTN, stage 4 lung CA, h/o b/l PE, hx aortofemoral bypass, hx celiac artery stent, hx SMA stent COPD on home oxygen (6 L), CAD s/p RCA stent, hx ischemic bowel surgery who presented with cough, wheezing, and acute shortness of breath. Patient had her first chemo treatment last week. As per , she has been coughing up blood at times. Patient presents in acute respiratory distress and was hypoxic to 80's on RA. Patient placed on immediate BIPAP after being rushed into Critical Care ED. Patient had CT scan of chest to evaluate lung and for PE. Respiratory status improved with BIPAP.  Patient found to have multi-focal pneumonia and treated for HCAP. No PE on CT scan of chest.    ICU admission for sepsis, multifocal pneumonia, and respiratory distress.    9/25: intubated, OG feeds initiated  9/26: episode of afib w/ RVR, HR up to 160, cardioverted, started on amiodarone drip w/ goal of weaning to PO metoprolol  9/27: extubated    Acute hypoxemic respiratory failure  - did poorly on BiPAP, HFNC, persistently respiring >30 and was intubated  - now extubated  - now on BiPAP, will transition to HFNC    HCAP vs chemo related pneumonitis. h/o Stage IV squamous cell carcinoma  - CT w/ IVC 9/23 w/ bilateral multifocal patchy airspace opacities consistent with pneumonia  - CXR 9/23 Bilateral, left greater than right interstitial and airspace opacities, new since prior.  - MRSA swab negative  - tracheal culture w/ normal respiratory angel    COPD exacerbation  - on home oxygen (6L)    Afib w/ RVR, likely paroxysmal, likely secondary to long standing PAH  - EKG on admission showed afib  - Echo 9/26, EF 65-70%, consistent with severe pulmonary arterial hypertension  - s/p electrical cardioversion  - now rate controlled  - c/w amiodarone PO    h/o PE  - on therapeutic lovenox    Anxiety  - holding home ativan while on versed sedation

## 2018-09-28 NOTE — CHART NOTE - NSCHARTNOTEFT_GEN_A_CORE
Extubated this morning  Had been tolerating TF's well while intubated  PO diet ordered today, Tube feeds d/c'd    T(F): 99.1 (09-28-18 @ 12:00), Max: 99.1 (09-28-18 @ 12:00)  HR: 110 (09-28-18 @ 13:00) (58 - 118)  BP: 154/65 (09-28-18 @ 13:00) (94/45 - 176/87)  RR: 28 (09-28-18 @ 13:00) (13 - 35)  SpO2: 92% (09-28-18 @ 13:00) (86% - 100%)  Mode: CPAP with PS  FiO2: 40  PEEP: 8  PS: 6    I&O's Detail    27 Sep 2018 07:01  -  28 Sep 2018 07:00  --------------------------------------------------------  IN:    amiodarone Infusion: 133.6 mL    fentaNYL Infusion.: 465.5 mL    IV PiggyBack: 500 mL    midazolam Infusion: 84.5 mL    Peptamen A.F.: 1050 mL  Total IN: 2233.6 mL    OUT:    Indwelling Catheter - Urethral: 1680 mL  Total OUT: 1680 mL    Total NET: 553.6 mL    09-28    135  |  97<L>  |  43<H>  ----------------------------<  278<H>  4.6   |  23  |  1.1    Ca    9.2      28 Sep 2018 04:34  Mg     2.0     09-28    TPro  4.9<L>  /  Alb  2.6<L>  /  TBili  <0.2  /  DBili  x   /  AST  11  /  ALT  11  /  AlkPhos  81  09-28                          9.1    21.20 )-----------( 295      ( 28 Sep 2018 04:34 )             29.3     CAPILLARY BLOOD GLUCOSE  POCT Blood Glucose.: 179 mg/dL (28 Sep 2018 13:06)  POCT Blood Glucose.: 260 mg/dL (27 Sep 2018 21:45)     Diet, DASH/TLC:   Sodium & Cholesterol Restricted  Consistent Carbohydrate {No Snacks} (09-28-18 @ 11:27)    Plan:  -PO as tolerated  -add glucerna shakes 240ml PO q12  -check calorie counts X 3 days

## 2018-09-28 NOTE — PROGRESS NOTE ADULT - SUBJECTIVE AND OBJECTIVE BOX
CHIEF COMPLAINT:    Patient is a 72y old Female who presents with a chief complaint of SOB, wheezing (28 Sep 2018 08:45)    Currently admitted to medicine with the primary diagnosis of Multifocal pneumonia     Today is hospital day 5d. This morning she is resting comfortably in bed and reports no new issues or overnight events.     PAST MEDICAL & SURGICAL HISTORY  Anxiety  Dyslipidemia  Pulmonary embolism  Lung cancer  H/O heart surgery: STENT PLACEMENT BYPASS AORTA CELIAC STENT  MI (myocardial infarction)  HTN (hypertension)  Aortocoronary bypass status  Altered bowel function: IN PAST HAD BOWEL SURGERY    SOCIAL HISTORY:  Negative for smoking/alcohol/drug use.     ALLERGIES:  No Known Allergies    MEDICATIONS:  STANDING MEDICATIONS  ALBUTerol    90 MICROgram(s) HFA Inhaler 1 Puff(s) Inhalation every 4 hours  amiodarone    Tablet 200 milliGRAM(s) Oral daily  amiodarone Infusion 0.5 mG/Min IV Continuous <Continuous>  cefepime   IVPB      cefepime   IVPB 2000 milliGRAM(s) IV Intermittent every 8 hours  chlorhexidine 0.12% Liquid 15 milliLiter(s) Swish and Spit two times a day  chlorhexidine 4% Liquid 1 Application(s) Topical <User Schedule>  dextrose 5%. 1000 milliLiter(s) IV Continuous <Continuous>  dextrose 50% Injectable 12.5 Gram(s) IV Push once  dextrose 50% Injectable 25 Gram(s) IV Push once  dextrose 50% Injectable 25 Gram(s) IV Push once  docusate sodium Liquid 100 milliGRAM(s) Oral two times a day  enoxaparin Injectable 70 milliGRAM(s) SubCutaneous every 12 hours  fentaNYL   Infusion. 0.5 MICROgram(s)/kG/Hr IV Continuous <Continuous>  insulin glargine Injectable (LANTUS) 8 Unit(s) SubCutaneous at bedtime  levoFLOXacin IVPB 750 milliGRAM(s) IV Intermittent every 24 hours  levoFLOXacin IVPB      methylPREDNISolone sodium succinate Injectable 60 milliGRAM(s) IV Push every 12 hours  metoprolol tartrate 12.5 milliGRAM(s) Oral two times a day  midazolam Infusion 0.02 mG/kG/Hr IV Continuous <Continuous>  multivitamin 1 Tablet(s) Oral daily  pantoprazole    Tablet 40 milliGRAM(s) Oral before breakfast  senna 2 Tablet(s) Oral at bedtime    PRN MEDICATIONS  ALBUTerol    90 MICROgram(s) HFA Inhaler 2 Puff(s) Inhalation every 4 hours PRN  dextrose 40% Gel 15 Gram(s) Oral once PRN  glucagon  Injectable 1 milliGRAM(s) IntraMuscular once PRN    VITALS:   T(F): 98  HR: 112  BP: 167/92  RR: 30  SpO2: 91%    LABS:                        9.1    21.20 )-----------( 295      ( 28 Sep 2018 04:34 )             29.3     09-28    135  |  97<L>  |  43<H>  ----------------------------<  278<H>  4.6   |  23  |  1.1    Ca    9.2      28 Sep 2018 04:34  Mg     2.0     09-28    TPro  4.9<L>  /  Alb  2.6<L>  /  TBili  <0.2  /  DBili  x   /  AST  11  /  ALT  11  /  AlkPhos  81  09-28    ABG - ( 28 Sep 2018 07:45 )  pH, Arterial: 7.33  pH, Blood: x     /  pCO2: 55    /  pO2: 118   / HCO3: 29    / Base Excess: 2.5   /  SaO2: 100       Culture - Bronchial (collected 25 Sep 2018 14:20)  Source: .Broncial None  Gram Stain (26 Sep 2018 07:23):    Few Squamous epithelial cells per low power field    Few polymorphonuclear leukocytes per low power field    Rare Gram variable coccobacilli per oil power field    Numerous Yeast like cells per oil power field  Final Report (27 Sep 2018 16:57):    Normal Respiratory Nelly present    CARDIAC MARKERS ( 27 Sep 2018 04:23 )  x     / <0.01 ng/mL / x     / x     / x      CARDIAC MARKERS ( 26 Sep 2018 21:07 )  x     / <0.01 ng/mL / x     / x     / x      CARDIAC MARKERS ( 26 Sep 2018 11:38 )  x     / <0.01 ng/mL / x     / x     / x        RADIOLOGY:  < from: Xray Chest 1 View-PORTABLE IMMEDIATE (09.25.18 @ 10:37) >  Support devices: Endotracheal tube tip above the huyen. NG tube tip just   distal to the EG junction.. Telemetry leads.    Impression:    Bilateral opacities and left pleural effusion, unchanged. Support devices as described.  < end of copied text >    < from: Transthoracic Echocardiogram (09.24.18 @ 10:30) >   1. Left ventricular ejection fraction, by visual estimation, is 65 to 70%.   2. Normal global left ventricular systolic function.   3. Spectral Doppler shows impaired relaxation pattern of left ventricular myocardial filling (Grade I diastolic dysfunction).   4. Trivial aortic regurgitation.   5. Mild-to-moderate mitral regurgitation.   6. Mild-to-moderate tricuspid regurgitation.   7. Estimated pulmonary artery systolic pressure is 111.9 mmHg assuming a right atrial pressure of 3 mmHg, which is consistent with severe pulmonary hypertension.  < end of copied text >    PHYSICAL EXAM:  GEN: intubated  PULM: no wheezes  CARD: S1/S2 present. RRR.   GI: Soft, non-distended. Bowel sounds present  NEURO: sedated

## 2018-09-28 NOTE — CONSULT NOTE ADULT - ASSESSMENT
72yFemale being evaluated for goals of care. Patient is noted to be very restlessness and agitated on a BIPAP. Patient was given ativan 2mg iv and she responded well. Palliative team d/w spouse and daughter at bedside about diagnosis, prognosis and disease process. Family are aware of patient's overall medical conditions and poor prognosis.  Family said patient at baseline is on home 02 @ 6litres, multiple hospitalization and progressive decline in functional status in the past few months. Family requested discontinuation of aggressive intervention including immunotherapy, resuscitation and intubation. Family wants to withdraw patient from the BIPAP as soon  as patient's son from [place] arrives and pursue comforts measures only henceforth. Palliative Np explained  to family the likelihood of patient progressing into respiratory distress and death upon withdrawal of BIPAP. Family verbalized understanding and will still proceed when the son arrives.     case d/w ICU staff, and Attending    Goals of care documented.         Recommendations:  Ativan 2mg iv q4hrs PRN Agitation  Morphine 4mg iv q2hrs PRN respiratory distress  No further escalation of care  DNI/DNR  BIPAP removal when son arrives  poor prognosis  we will f/u 72yFemale being evaluated for goals of care. Patient is noted to be very restlessness and agitated on a BIPAP. Patient was given ativan 2mg iv and she responded well. Palliative team d/w spouse and daughter at bedside about diagnosis, prognosis and disease process. Family are aware of patient's overall medical conditions and poor prognosis.  Family said patient at baseline is on home 02 @ 6litres, multiple hospitalization and progressive decline in functional status in the past few months. Family requested discontinuation of aggressive intervention including immunotherapy, resuscitation and intubation. Family wants to withdraw patient from the BIPAP as soon  as patient's son from [place] arrives and pursue comforts measures only henceforth. Palliative Np explained  to family the likelihood of patient progressing into respiratory distress and death upon withdrawal of BIPAP. Family verbalized understanding and will still proceed when the son arrives.     case d/w ICU staff, and Attending    Goals of care documented.         Recommendations:  Ativan 2mg iv q4hrs PRN Agitation  Morphine 4mg iv q2hrs PRN respiratory distress  Morphine drip at 4mg/hr post BIPAP removal   No further escalation of care  DNI/DNR  BIPAP removal when son arrives  poor prognosis  we will f/u 72yFemale being evaluated for goals of care. Patient is noted to be very restlessness and agitated on a BIPAP. Patient was given ativan 2mg iv and she responded well. Palliative team d/w spouse and daughter at bedside about diagnosis, prognosis and disease process. Family are aware of patient's overall medical conditions and poor prognosis.  Family said patient at baseline is on home 02 @ 6litres, multiple hospitalization and progressive decline in functional status in the past few months. Family requested discontinuation of aggressive intervention including immunotherapy, resuscitation and intubation. Family wants to withdraw patient from the BIPAP as soon  as patient's son from [place] arrives and pursue comforts measures only henceforth. Palliative Np explained  to family the likelihood of patient progressing into respiratory distress and death upon withdrawal of BIPAP. Family verbalized understanding and will still proceed when the son arrives.     case d/w ICU staff, and Attending    Goals of care documented. 15 minutes spent in advanced care planing.        Recommendations:  Ativan 2mg iv q4hrs PRN Agitation  Morphine 4mg iv q2hrs PRN respiratory distress  Morphine drip at 4mg/hr post BIPAP removal  if pt demonstrates need for frequent morphine dosing.  No further escalation of care  DNI/DNR  BIPAP removal when son arrives  poor prognosis  we will f/u

## 2018-09-28 NOTE — PROGRESS NOTE ADULT - ASSESSMENT
IMPRESSION:    Acute hypoxemic respiratory failure  HCAP VS pneumonitis improving   HO Stage IV squamous cell carcinoma  COPD exacerbation improved   HO PE on Lovenox  Afib with RVR s/p cardioversion     PLAN:    CNS:  SAT.   Sedation with fentanyl/ Precedex if needed     HEENT: Oral care     PULMONARY:  HOB @ 45 degrees.  Nebs Q 4.  c/w Solumedrol 60 mg Q.12 Repeat ABG in 4 hours and wean O2.  SBT.  Extubate to NIV     CARDIOVASCULAR:  I=O.  Continue AMIo.  FU with Cardiology    GI: GI prophylaxis.  OG feeding. Bowel regimen     RENAL:  Follow up lytes.  Correct as needed; Check Mg    INFECTIOUS DISEASE: Follow up cultures.  DC VAnc        HEMATOLOGICAL:  DVT TX    ENDOCRINE:  Follow up FS.  Insulin protocol if needed.    GOC DW daughter      MENG Donato if extubated   AI positive

## 2018-10-11 DIAGNOSIS — Z51.5 ENCOUNTER FOR PALLIATIVE CARE: ICD-10-CM

## 2018-10-11 DIAGNOSIS — Z79.82 LONG TERM (CURRENT) USE OF ASPIRIN: ICD-10-CM

## 2018-10-11 DIAGNOSIS — Z87.891 PERSONAL HISTORY OF NICOTINE DEPENDENCE: ICD-10-CM

## 2018-10-11 DIAGNOSIS — J44.0 CHRONIC OBSTRUCTIVE PULMONARY DISEASE WITH (ACUTE) LOWER RESPIRATORY INFECTION: ICD-10-CM

## 2018-10-11 DIAGNOSIS — I73.9 PERIPHERAL VASCULAR DISEASE, UNSPECIFIED: ICD-10-CM

## 2018-10-11 DIAGNOSIS — J18.9 PNEUMONIA, UNSPECIFIED ORGANISM: ICD-10-CM

## 2018-10-11 DIAGNOSIS — J44.1 CHRONIC OBSTRUCTIVE PULMONARY DISEASE WITH (ACUTE) EXACERBATION: ICD-10-CM

## 2018-10-11 DIAGNOSIS — F41.9 ANXIETY DISORDER, UNSPECIFIED: ICD-10-CM

## 2018-10-11 DIAGNOSIS — I48.91 UNSPECIFIED ATRIAL FIBRILLATION: ICD-10-CM

## 2018-10-11 DIAGNOSIS — I25.10 ATHEROSCLEROTIC HEART DISEASE OF NATIVE CORONARY ARTERY WITHOUT ANGINA PECTORIS: ICD-10-CM

## 2018-10-11 DIAGNOSIS — Z86.711 PERSONAL HISTORY OF PULMONARY EMBOLISM: ICD-10-CM

## 2018-10-11 DIAGNOSIS — A41.9 SEPSIS, UNSPECIFIED ORGANISM: ICD-10-CM

## 2018-10-11 DIAGNOSIS — J96.01 ACUTE RESPIRATORY FAILURE WITH HYPOXIA: ICD-10-CM

## 2018-10-11 DIAGNOSIS — Z66 DO NOT RESUSCITATE: ICD-10-CM

## 2018-10-11 DIAGNOSIS — I10 ESSENTIAL (PRIMARY) HYPERTENSION: ICD-10-CM

## 2018-10-11 DIAGNOSIS — C34.91 MALIGNANT NEOPLASM OF UNSPECIFIED PART OF RIGHT BRONCHUS OR LUNG: ICD-10-CM

## 2018-10-11 DIAGNOSIS — E78.5 HYPERLIPIDEMIA, UNSPECIFIED: ICD-10-CM

## 2018-10-11 DIAGNOSIS — Z99.81 DEPENDENCE ON SUPPLEMENTAL OXYGEN: ICD-10-CM

## 2018-10-11 DIAGNOSIS — R06.02 SHORTNESS OF BREATH: ICD-10-CM

## 2018-10-11 DIAGNOSIS — I27.21 SECONDARY PULMONARY ARTERIAL HYPERTENSION: ICD-10-CM

## 2018-10-11 DIAGNOSIS — I25.2 OLD MYOCARDIAL INFARCTION: ICD-10-CM

## 2018-10-13 LAB
CULTURE RESULTS: SIGNIFICANT CHANGE UP
SPECIMEN SOURCE: SIGNIFICANT CHANGE UP

## 2018-10-17 ENCOUNTER — APPOINTMENT (OUTPATIENT)
Dept: HEMATOLOGY ONCOLOGY | Facility: CLINIC | Age: 73
End: 2018-10-17

## 2018-10-19 ENCOUNTER — APPOINTMENT (OUTPATIENT)
Dept: INFUSION THERAPY | Facility: CLINIC | Age: 73
End: 2018-10-19

## 2018-12-10 ENCOUNTER — APPOINTMENT (OUTPATIENT)
Dept: CARDIOLOGY | Facility: CLINIC | Age: 73
End: 2018-12-10

## 2020-01-01 NOTE — ED PROCEDURE NOTE - CPROC ED PHYSICIAN PRESENCE1
I was present during the key portion of the procedure.
You can access the FollowMyHealth Patient Portal offered by [facility] by registering at the following website: [email or web address]. By joining [employer]’s FollowMyHealth portal, you will also be able to view your health information using other applications (apps) compatible with our system.

## 2023-09-26 NOTE — ED ADULT NURSE NOTE - DOES PATIENT HAVE ADVANCE DIRECTIVE
Exposure to the sun's invisible ultraviolet light (UV) is the most preventable cause of skin cancers, including melanoma, for most people. It is also responsible for skin aging and contributes to skin discoloration over time.  Protecting your skin from UV light, and regular skin self-exams are important steps to preventing and catching skin cancers early.  Below are a few tips about Sun Safety and Sun Protection.    UV light  There are three components of UV light:    o    UVA: least powerful UV rays but the most abundant on earth and present year round; UVA penetrates through window glass and contributes to skin aging (wrinkles and age spots) as well as skin cancer.  Best prevented with barrier clothing and zinc oxide.    o    UVB:  more powerful than UVA, these rays are partially absorbed by the ozone layer. UVB exposure is the primary cause of sunburn and a risk factor for skin cancer and skin aging. UVB levels are highest between 10am-4pm and in the summer.  Best prevented with barrier clothing and zinc oxide.    o    UVC: most powerful UV ray and highly toxic to skin and lungs.. Fortunately, UVC is mostly absorbed by the ozone layer in the atmosphere, although it can be generated by air pollution on hot days.      Risks of UV exposure, including from indoor tanning    o    Sun burn  o    Development of pre-cancerous lesions called actinic keratoses  o    Increased risk of non-melanoma skin cancer   o    Increased risk of melanoma skin cancer  o    Premature skin aging and discoloration  o    Cataracts and eye damage  o    Suppression of the immune system     There is no such thing as a \"safe\" tan. Indoor and outdoor tanning can both cause melanoma and increase the risk of a benign mole transforming to melanoma. The World Health Organization has declared indoor tanning beds and sun lamps to be known carcinogens (cancer-causing substances).  UV radiation from indoor tanning devices has been associated with  increased risk of melanoma and non-melanoma skin cancers.  It damages the DNA in skin cells and can lead to premature skin aging, wrinkles, cataracts, ocular melanoma, and other eye damage.  Frequent, intentional exposure to UV light may also lead to tanning addiction--more than 1/5 of  women age 18-30 exhibit indoor tanning dependence.  If you are currently indoor tanning, please stop and do not ever start again. Furthermore, tanning beds use UVA light, which does not trigger protective responses in the skin the same way that UVB light does following a sunburn, so although the skin is treva it is not necessarily more protected from the damaging effects of the sun.  It also does not generate vitamin D (this only occurs from UVB exposure).  It is illegal for children under the age of 16 to use an indoor tanning bed in [place].    Vitamin D Recommendations  Vitamin D is necessary for strong bones and immune system health. Vitamin D deficiency has been associated with bone softening (osteomalacia) in adults and rickets in children as well as other diseases including type I diabetes, high blood pressure and certain cancers.     There are 3 sources of vitamin D.  1. Ultraviolet (UV) radiation (specifically UVB) from the sun  2. Certain foods - oily fish such as salmon, fortified dairy products, and cereals  3. Vitamin supplements     The benefits of UV exposure for vitamin D production cannot be  from its harmful effects which include skin damage, eye damage, premature aging and skin cancer. Therefore, the American Academy of Dermatology and the Skin Cancer Foundation recommend that everyone obtains vitamin D safely from foods and/or vitamin supplements. Vitamin D can be toxic in high doses; 2000 IU per day is the upper limit of safety    For adults, the recommendation for daily vitamin D intake is 7575-4129 IU (international units).  For children under the age of 18 including infants, the American  Academy of Pediatrics recommends a daily vitamin D intake of 400 IU.    Sun Protection    Avoid direct sun exposure between 10am and 4pm. These are peak hours of the day when UV rays are strongest. Seek shade and use sun protection during these times of day. The UV Index, developed by the EPA and National Weather Service, can help you find out if the sun’s rays will be strong on any given day and based on your geographic location. This is available online and in phone apps like [employer] Weather Channel    UV Index:  2 or less: low  3 to 5: moderate  6 to 7: high  8 to 10: very high   11+: extreme    Note: some medications such as doxycycline can increase your sensitivity to UV light, as do certain medical conditions such as lupus erythematosus    Stay in the shade.  Shade is better than direct sunlight, but does NOT provide full protection.  UV rays reflect off of many surfaces including sand, snow, water, or concrete.  Additionally, they can penetrate through tree cover and fabrics that are not UPF (Ultraviolet Protection Factor, see below) rated.  Clear window glass does NOT protect from UVA rays.  Higher altitudes have both reflected UV rays from snow and a higher concentration of UV light. Remember your sun protection on bright winter skiing vacations as well.    Protect your eyes and lips.  Protect your lips by applying lip balm that contains sunscreen and reapply regularly as you would sunscreen.  The lower lip is especially vulnerable as it is more exposed.  Wear sunglasses to protect the skin of your eyelids, which can be difficult to completely cover without getting sunscreen into the eyes by accident.  Sunglasses can also help protect the eyes themselves from getting sun damaged over time.    Polypodium leucomotos: this is marketed as an herbal supplement and antioxidant that is reported to concentrate in the skin (Heliocare is one example of a brand). It has shown limited benefit in case reports for  sun-sensitive disorders like polymorphous light eruption (PMLE) and systemic lupus erythematosus (SLE). It is definitely not a substitute for sunscreen and has not undergone the same kinds of rigorous testing by the Food and Drug Administration (FDA) that other sunscreens have. It has also not been tested in people < 18 years old or pregnant women.  But it has been used in [place] for years with a good safety profile and may be a helpful addition to existing sun safety measures for those patients who are interested in oral or herbal approaches to sun protection.    Nicotinamide/niacinamide: This is a form of vitamin B3 that is not associated with flushing, as occurs with niacin (nicotinic acid) that is sometimes used to raise HDL (\"good\") cholesterol.  A randomized, controlled trial showed modest benefit in decreasing the risk of new non-melanoma skin cancers and precancers in individuals with a history of at least 2 skin cancers in the previous 5 years when taken at a dose of 500mg twice daily. [employer] is an example of a brand that can be found on [employer].  Heliocare also makes a combination niacinamide/polypodium leucomotos product (no conflicts of interest for [employer] or [employer]).    Sunscreen  SPF (Sun Protection Factor) is a rating of the amount of protection from UVB radiation offered by a sunscreen.  Choose sunscreen with aN SPF of at least 30; higher numbers are more effective, but also more expensive. An SPF15 screens out 93% of the sun's UVB rays, while SPF30 protects against 97% and SPF50 against 98% if the appropriate amount is applied. An SPF of 30+ should suffice for most activities, as long as reapplication directions are followed according to the 's instructions.  Choose sunscreen that is “Broad Spectrum”, which will protect against UVA and UVB rays. Two classes of sunscreens exist:     Chemical absorbers: common ingredients that protect against UVB include cinnamates,  salicylates, PABA, padimate-O, octocrylene, octinoxate. Common ingredients that protect against UVA in chemical absorbers: avobenzone (parsol 1789), benzophenone, oxybenzone, and mexoryl.  *Oxybenzone is thought to be damaging to coral reefs and is also allergenic (meaning many patients develop an allergic rash to it) so it is worth trying to find a sunscreen without it.  **A 2019 study in the Journal of the American Medical Association (RANDY) showed that some chemical sunscreen ingredients are absorbed through the skin with consistent use. It is still not clear if these detectable blood levels of sunscreen lead to any harmful effects on the body, but research is ongoing.  Physical blockers: the physical blockers titanium dioxide and zinc oxide protect against BOTH UVA and UVB. Of the two, zinc oxide does a better job.  Physical blocker sunscreens are recommended for people with sensitive skin and children over 6 months of age.  All of the big sunscreen manufacturers such as Neutrogena and Coppertone offer both physical and chemical sunscreen lines.  Tinted sunscreens: these typically contain ferric oxide, which has been shown to filter out certain parts of white or so-called \"visible\" light--all the colors of the rainbow such as red, orange, yellow, green, blue, and violet light that we can see and are exposed to from lamps, streetlights, TV/computer/cell phone screens, etc.  Tinted sunscreens are usually only recommended for a handful of skin conditions, the most common of which is melasma.    Apply sunscreen 15 minutes before sun exposure and use enough to generously coat all exposed skin. Reapply to all exposed areas every 2 hours, or every 80 minutes if using a water- or sweat-resistant sunscreen. It takes about 1 ounce of sunscreen to cover exposed surfaces in an adult, so the meticulous patient will go through 1 beach-sized bottle of sunscreen (~6.4oz) a week.  The formulation of sunscreen is up to you! In  No general, sprays and sticks are more likely to be applied unevenly and to contain a carcinogen called benzene (reported by Brijesh in 2021), so lotions/creams/gels/foams are probably more effective. Apply a lip balm containing at least SPF30 to your lips 30 minutes before going into the sun and also reapply at least every 2 hours.    Ultimately, if you follow the above guidelines, the brand of sunscreen is irrelevant. Physical blocking agents are the most effective and have the most safety data supporting their use, but the best sunscreen is the one that you will use!!! Common examples of brands that offer broad spectrum protection are: Neutrogena, Coppertone, Blue Lizard, SunBum, Bare Republic, LaRoche-Posay, EltaMD, and Hang Ten. Sunscreens that may be preferred for darker skin types: Alastin Hydratint, Elta UV Clear, Aveeno Positively Radiant, PCA Sheer Tint/Weightless protection, Topix BB Sunblock, Supergoop Unseen Sunscreen SPF40, TiZO3/TiZO AM    Water and sand reflect ultraviolet rays from the sun and can increase your chance of getting a sunburn.  Sand reflects 15% of UV light; water reflects up to 10% and sea foam reflects about 25% according to the World Health Organization. Reflected UV radiation increases your UV radiation exposure. Sunglasses that filter out % of UV rays should also be a part of your sun protection regimen.    Please visit the Skin Cancer Foundation at  http://www.skincancer.org/products/categories for more examples of brands    Sun protective clothing  Cover your head with a wide-brimmed hat (4 inches or wider).  Clothes are an excellent source of protection from the sun, but not all clothing offers the same amount of protection from UV light.  Wet clothing provides almost NO protection from UV.  Dark clothes are better than white clothes at filtering out UV, but weave and fabric type are stronger determinants of how much protection will be offered than color.  Tightly woven  clothing (like lo or canvas) provides more protection from the sun's rays than does loosely woven clothing and cotton. A white T-shirt offers minimal protection, equivalent to SPF4. Specially designed \"UPF\" (UV protective factor) clothing provides great protection from the sun without the need for reapplication of sunscreen. Look for UPF 30 or higher, and be aware that the UPF factor can decrease over a lifetime of the clothing as it is used.  Brand of UPF clothing doesn’t matter. Common manufacturers include: Coolibar, Solbari, UV Skinz, Obion, [employer] etc. Please visit the Skin Cancer Foundation at  http://www.skincancer.org/products/categories for more examples of brands    Learn more about sunprotection, skin aging and skin cancer at these other websites:  American Academy of Dermatology: www.aad.org   American Society for Dermatologic Surgery: www.asds.net   American College of Mohs Surgery: http://www.skincancermohssurgery.org  After Transplantation, Reduce Incidence of Skin Cancer (for transplant patients): http://www.at-risc.org/    ABCDE's of Melanoma: from https://www.melanoma.org    A - Asymmetrical Shape  Melanoma lesions are often irregular, or not symmetrical, in shape. Benign moles are usually symmetrical.    B - Border  Typically, non-cancerous moles have smooth, even borders. Melanoma lesions usually have irregular borders that are difficult to define.    C - Color  The presence of more than one color (blue, black, brown, tan, etc.) or the uneven distribution of color can sometimes be a warning sign of melanoma. Benign moles are usually a single shade of brown or tan.     D - Diameter  Melanoma lesions are often greater than 6 millimeters in diameter (approximately the size of a pencil eraser).    E - Evolution (or change)  The evolution of your mole(s) has become the most important factor to consider when it comes to diagnosing a melanoma. If a mole has gone through recent changes in color  and/or size, bring it to the attention of a dermatologist immediately.           Codes to discuss with insurance for office charges:  ICD-10 (diagnosis) code: skin tag, L91.8  CPT (procedure) code: 71658, removal of skin tags up to 15; 34519, every 10 tags thereafter    Codes to discuss with insurance for pathology charges if applicable:  [phone] ([facility]) for ACL labs dermatopathology    [email or web address]        Wound Care   Your wound site will be red, tender and swollen for the first days. That is normal!   It is also normal to see some clear, lightly yellow drainage as well as crusting within the first days.   Leave bandage on for 24-48 hours after the procedure.   Keep the dressing clean and dry until then. If the dressing comes off sooner, replace it with a new dressing or a band-aid.   ?  After 24-48 Hours:   Wash your hands.   Remove bandage prior to shower or bath. Carefully wash the area with antibacterial soap. Rinse well. Let area air dry or gently pat dry.   The crust, if there is any, will slowly come off as you clean daily; do not force crust off.      Apply plain petroleum jelly/Vaseline to the wound daily until it has completely healed (NOT NEOSPORIN/TRIPLE ANTIBIOTIC OINTMENT; ~5% of Americans end up developing allergies to bacitracin and/or neomycin found in triple antibiotic formulations, and a head-to-head study comparing triple antibiotic vs plain petroleum jelly found the same frequency of infection in both groups, w/more drug-resistant infections in the triple antibiotic group)  Cover with band-aid or other dressing.   Clean the wound daily until healed or sutures are removed.   If you are getting irritation from the band-aid adhesive, try putting the band-aid in different directions. You could also make a dressing with a small, nonstick pad held in place by paper tape. You may apply over-the-counter  hydrocortisone cream to irritated skin; do not put on biopsy or surgical site.      Things to Avoid:   Avoid swimming, hot tubs, soaking the wound in a bath; a gentle shower is okay.   Avoid excessive stretching or exercising of areas involving or close to the wound.   Avoid exposing wound to dust or dirt without a bandage applied to protect it.   ?  Bleeding:   Some wounds bleed or ooze during the first 24 hours, especially if you are taking a blood thinner.      If the wound does bleed and you see blood soaking through the bandage, do not remove the bandage. Cover the bandage with a clean gauze pad and apply constant pressure for at least 20 minutes, without letting up.      Raise the wound site using a pillow, or sitting position if the lesion is on the head   Apply an ice pack or cold compresses over the covered wound dressing      If bleeding does not stop after 20 minutes of constant pressure call the clinic during working hours, 8-5 or go the emergency room if clinic is closed.   ?  Changes that are normal:   Redness and swelling around the procedure site during the first few days that improves over time   Tenderness is normal during the first days, but should also improve with time   Clear or light yellow drainage is normal      Signs of Infection:   Wound infection is uncommon during the first 1-2 days.      Signs of infection:   Spreading of redness and/or swelling at surgical site 1-2 days after the procedure   It is more tender after 1-2 days   Fever (greater than 100.4°F) with 2 readings, 4 hours apart   Increased warmth at the site   Green or yellow discharge      Pain:   Mild tenderness is normal, especially with activity. You may take acetaminophen (Tylenol®) per package instructions as needed. Don’t take aspirin or ibuprofen products for 72 hours after procedure unless they are something you normally take; these may increase the risk of bleeding.  ?  Stitches and Scars:   Stitches can sometimes come  out or the wound edges can come apart before stitch removal.   In that case continue wound care as before, the wound will heal on its own. No new stitches will be placed in that case.   You will have a scar at the wound site. Some scars may thicken and others may take several months to flatten out.. Most scars will improve over time but may take several months.   ?  Tips for the first 1-2 days:   If your wound is on your face, head or neck:   Sleep with your head up on 2 pillows, so the swelling will be less   Avoid bending with your head below your heart   If your wound is on your arms or leg:   Keep your arm or leg raised as much as you can   Ace or elastic wraps can be used to decrease swelling and support the areas during activity.   ?         DRY SKIN CARE HANDOUT     Recommendations for sensitive and dry skin    1.     Use lukewarm water- avoid hot or cold water    2.     Wash gently with the hands; do not vigorously scrub with a washcloth, sponge, or brush.    3.     Use very little mild soap, and only in areas where needed.            Examples of little mild soap: unscented Dove, Basis, Purpose, Cetaphil    4.     Limit bathing time to 5-10 minutes.    5.     Do not use bubble bath.    6.     After bathing, pat the skin dry gently with a towel.    7.     Immediately after bathing apply a fragrance-free moisturizer to the entire skin surface.  Examples of moisturizer:  CeraVe cream or ointment, Cetaphil cream, Vanicream    8.     Reapply the moisturizer several times a day.  In hot weather, to avoid causing heat rash, do NOT apply creams or ointments immediately before going into a hot environment    9.     Avoid use of colognes, perfumes, sprays, powders, etc. on the skin.    10.  Use small amounts of fragrance free laundry detergents. Double rinse clothes after washing if dermatitis is persistent.  Avoid use of fabric softeners and dryer sheets.    11.  Prescription creams and ointments should be applied to  affected areas only. Always apply medications to skin first, and apply moisturizers after.    12.  Avoid tight and rough clothing. Wash all new clothes prior to wearing. Avoid wearing wool and synthetic fibers directly against the skin.    13.  Avoid saunas and steam baths- these hot temperatures dry out the skin.  Using a humidifier or vaporizer may be helpful. Keep it clean to prevent the spread of molds.    14.  Cover carpeted play areas on the floor with cotton blankets, mats, etc.    15.  AVOID environments that are filled with DUST, and CIGARETTE SMOKE and AIRBORNE FRAGRANCE (air fresheners, \"plug-ins\", perfumes, colognes, incense). These airborne substances may irritate the skin.    16. For dry lips: plain petroleum jelly or Aquaphor
